# Patient Record
Sex: FEMALE | Race: WHITE | Employment: UNEMPLOYED | URBAN - METROPOLITAN AREA
[De-identification: names, ages, dates, MRNs, and addresses within clinical notes are randomized per-mention and may not be internally consistent; named-entity substitution may affect disease eponyms.]

---

## 2022-07-17 ENCOUNTER — HOSPITAL ENCOUNTER (INPATIENT)
Age: 87
LOS: 6 days | Discharge: NURSING, REHAB OR LONG-TERM CARE | DRG: 659 | End: 2022-07-23
Attending: EMERGENCY MEDICINE | Admitting: INTERNAL MEDICINE
Payer: COMMERCIAL

## 2022-07-17 ENCOUNTER — APPOINTMENT (OUTPATIENT)
Dept: GENERAL RADIOLOGY | Age: 87
DRG: 659 | End: 2022-07-17
Payer: COMMERCIAL

## 2022-07-17 DIAGNOSIS — N17.9 ACUTE RENAL FAILURE, UNSPECIFIED ACUTE RENAL FAILURE TYPE (HCC): ICD-10-CM

## 2022-07-17 DIAGNOSIS — N17.9 AKI (ACUTE KIDNEY INJURY) (HCC): Primary | ICD-10-CM

## 2022-07-17 LAB
ANION GAP SERPL CALCULATED.3IONS-SCNC: 21 MMOL/L (ref 7–16)
APTT: 43.6 SEC (ref 24.5–35.1)
BASOPHILS ABSOLUTE: 0.09 E9/L (ref 0–0.2)
BASOPHILS RELATIVE PERCENT: 1 % (ref 0–2)
BILIRUBIN URINE: NEGATIVE
BLOOD, URINE: NEGATIVE
BUN BLDV-MCNC: 80 MG/DL (ref 6–23)
CALCIUM SERPL-MCNC: 8.7 MG/DL (ref 8.6–10.2)
CHLORIDE BLD-SCNC: 101 MMOL/L (ref 98–107)
CLARITY: CLEAR
CO2: 17 MMOL/L (ref 22–29)
COLOR: YELLOW
CREAT SERPL-MCNC: 4.1 MG/DL (ref 0.5–1)
EOSINOPHILS ABSOLUTE: 0.22 E9/L (ref 0.05–0.5)
EOSINOPHILS RELATIVE PERCENT: 2.4 % (ref 0–6)
GFR AFRICAN AMERICAN: 13
GFR NON-AFRICAN AMERICAN: 10 ML/MIN/1.73
GLUCOSE BLD-MCNC: 97 MG/DL (ref 74–99)
GLUCOSE URINE: NEGATIVE MG/DL
HCT VFR BLD CALC: 40.1 % (ref 34–48)
HEMOGLOBIN: 12.8 G/DL (ref 11.5–15.5)
IMMATURE GRANULOCYTES #: 0.09 E9/L
IMMATURE GRANULOCYTES %: 1 % (ref 0–5)
INR BLD: 4.3
KETONES, URINE: NEGATIVE MG/DL
LEUKOCYTE ESTERASE, URINE: NEGATIVE
LYMPHOCYTES ABSOLUTE: 1.61 E9/L (ref 1.5–4)
LYMPHOCYTES RELATIVE PERCENT: 17.3 % (ref 20–42)
MCH RBC QN AUTO: 28.3 PG (ref 26–35)
MCHC RBC AUTO-ENTMCNC: 31.9 % (ref 32–34.5)
MCV RBC AUTO: 88.5 FL (ref 80–99.9)
MONOCYTES ABSOLUTE: 0.73 E9/L (ref 0.1–0.95)
MONOCYTES RELATIVE PERCENT: 7.8 % (ref 2–12)
NEUTROPHILS ABSOLUTE: 6.59 E9/L (ref 1.8–7.3)
NEUTROPHILS RELATIVE PERCENT: 70.5 % (ref 43–80)
NITRITE, URINE: NEGATIVE
PDW BLD-RTO: 13.3 FL (ref 11.5–15)
PH UA: 5.5 (ref 5–9)
PLATELET # BLD: 365 E9/L (ref 130–450)
PMV BLD AUTO: 9.3 FL (ref 7–12)
POTASSIUM SERPL-SCNC: 5 MMOL/L (ref 3.5–5)
PROTEIN UA: NEGATIVE MG/DL
PROTHROMBIN TIME: 46.5 SEC (ref 9.3–12.4)
RBC # BLD: 4.53 E12/L (ref 3.5–5.5)
SODIUM BLD-SCNC: 139 MMOL/L (ref 132–146)
SPECIFIC GRAVITY UA: 1.01 (ref 1–1.03)
UROBILINOGEN, URINE: 0.2 E.U./DL
WBC # BLD: 9.3 E9/L (ref 4.5–11.5)

## 2022-07-17 PROCEDURE — 1200000000 HC SEMI PRIVATE

## 2022-07-17 PROCEDURE — 81003 URINALYSIS AUTO W/O SCOPE: CPT

## 2022-07-17 PROCEDURE — 93005 ELECTROCARDIOGRAM TRACING: CPT | Performed by: STUDENT IN AN ORGANIZED HEALTH CARE EDUCATION/TRAINING PROGRAM

## 2022-07-17 PROCEDURE — 87088 URINE BACTERIA CULTURE: CPT

## 2022-07-17 PROCEDURE — 2580000003 HC RX 258: Performed by: EMERGENCY MEDICINE

## 2022-07-17 PROCEDURE — 6360000002 HC RX W HCPCS: Performed by: EMERGENCY MEDICINE

## 2022-07-17 PROCEDURE — 85025 COMPLETE CBC W/AUTO DIFF WBC: CPT

## 2022-07-17 PROCEDURE — 96372 THER/PROPH/DIAG INJ SC/IM: CPT

## 2022-07-17 PROCEDURE — 87186 SC STD MICRODIL/AGAR DIL: CPT

## 2022-07-17 PROCEDURE — 87077 CULTURE AEROBIC IDENTIFY: CPT

## 2022-07-17 PROCEDURE — 87040 BLOOD CULTURE FOR BACTERIA: CPT

## 2022-07-17 PROCEDURE — 85730 THROMBOPLASTIN TIME PARTIAL: CPT

## 2022-07-17 PROCEDURE — 99285 EMERGENCY DEPT VISIT HI MDM: CPT

## 2022-07-17 PROCEDURE — 36415 COLL VENOUS BLD VENIPUNCTURE: CPT

## 2022-07-17 PROCEDURE — 80048 BASIC METABOLIC PNL TOTAL CA: CPT

## 2022-07-17 PROCEDURE — 85610 PROTHROMBIN TIME: CPT

## 2022-07-17 PROCEDURE — 73502 X-RAY EXAM HIP UNI 2-3 VIEWS: CPT

## 2022-07-17 RX ORDER — POLYETHYLENE GLYCOL 3350 17 G/17G
17 POWDER, FOR SOLUTION ORAL DAILY PRN
Status: DISCONTINUED | OUTPATIENT
Start: 2022-07-17 | End: 2022-07-23 | Stop reason: HOSPADM

## 2022-07-17 RX ORDER — ZIPRASIDONE MESYLATE 20 MG/ML
10 INJECTION, POWDER, LYOPHILIZED, FOR SOLUTION INTRAMUSCULAR ONCE
Status: COMPLETED | OUTPATIENT
Start: 2022-07-17 | End: 2022-07-18

## 2022-07-17 RX ORDER — LISINOPRIL 10 MG/1
10 TABLET ORAL DAILY
COMMUNITY

## 2022-07-17 RX ORDER — ZIPRASIDONE MESYLATE 20 MG/ML
10 INJECTION, POWDER, LYOPHILIZED, FOR SOLUTION INTRAMUSCULAR ONCE
Status: COMPLETED | OUTPATIENT
Start: 2022-07-17 | End: 2022-07-17

## 2022-07-17 RX ORDER — ACETAMINOPHEN 325 MG/1
650 TABLET ORAL EVERY 6 HOURS PRN
Status: DISCONTINUED | OUTPATIENT
Start: 2022-07-17 | End: 2022-07-23 | Stop reason: HOSPADM

## 2022-07-17 RX ORDER — ROSUVASTATIN CALCIUM 10 MG/1
10 TABLET, COATED ORAL EVERY EVENING
COMMUNITY

## 2022-07-17 RX ORDER — ACETAMINOPHEN 650 MG/1
650 SUPPOSITORY RECTAL EVERY 6 HOURS PRN
Status: DISCONTINUED | OUTPATIENT
Start: 2022-07-17 | End: 2022-07-23 | Stop reason: HOSPADM

## 2022-07-17 RX ORDER — PROMETHAZINE HYDROCHLORIDE 12.5 MG/1
12.5 TABLET ORAL EVERY 6 HOURS PRN
Status: DISCONTINUED | OUTPATIENT
Start: 2022-07-17 | End: 2022-07-23 | Stop reason: HOSPADM

## 2022-07-17 RX ORDER — LISINOPRIL 10 MG/1
10 TABLET ORAL DAILY
Status: DISCONTINUED | OUTPATIENT
Start: 2022-07-18 | End: 2022-07-22

## 2022-07-17 RX ORDER — SODIUM CHLORIDE 9 MG/ML
INJECTION, SOLUTION INTRAVENOUS CONTINUOUS
Status: DISCONTINUED | OUTPATIENT
Start: 2022-07-17 | End: 2022-07-18

## 2022-07-17 RX ORDER — ATORVASTATIN CALCIUM 10 MG/1
10 TABLET, FILM COATED ORAL DAILY
COMMUNITY
End: 2022-07-17

## 2022-07-17 RX ORDER — HALOPERIDOL 1 MG/1
2 TABLET ORAL EVERY 8 HOURS PRN
Status: DISCONTINUED | OUTPATIENT
Start: 2022-07-17 | End: 2022-07-23 | Stop reason: HOSPADM

## 2022-07-17 RX ORDER — 0.9 % SODIUM CHLORIDE 0.9 %
1000 INTRAVENOUS SOLUTION INTRAVENOUS ONCE
Status: COMPLETED | OUTPATIENT
Start: 2022-07-17 | End: 2022-07-17

## 2022-07-17 RX ORDER — CARVEDILOL 12.5 MG/1
12.5 TABLET ORAL 2 TIMES DAILY WITH MEALS
COMMUNITY

## 2022-07-17 RX ORDER — ROSUVASTATIN CALCIUM 10 MG/1
10 TABLET, COATED ORAL EVERY EVENING
Status: DISCONTINUED | OUTPATIENT
Start: 2022-07-17 | End: 2022-07-23 | Stop reason: HOSPADM

## 2022-07-17 RX ORDER — HALOPERIDOL 5 MG/ML
2 INJECTION INTRAMUSCULAR EVERY 8 HOURS PRN
COMMUNITY

## 2022-07-17 RX ORDER — SODIUM CHLORIDE 0.9 % (FLUSH) 0.9 %
10 SYRINGE (ML) INJECTION PRN
Status: DISCONTINUED | OUTPATIENT
Start: 2022-07-17 | End: 2022-07-23 | Stop reason: HOSPADM

## 2022-07-17 RX ORDER — LORAZEPAM 1 MG/1
1 TABLET ORAL EVERY 6 HOURS PRN
Status: DISCONTINUED | OUTPATIENT
Start: 2022-07-17 | End: 2022-07-23 | Stop reason: HOSPADM

## 2022-07-17 RX ORDER — DIGOXIN 125 MCG
125 TABLET ORAL DAILY
COMMUNITY

## 2022-07-17 RX ORDER — ACETAMINOPHEN 325 MG/1
650 TABLET ORAL EVERY 6 HOURS PRN
COMMUNITY

## 2022-07-17 RX ORDER — LORAZEPAM 1 MG/1
1 TABLET ORAL EVERY 6 HOURS PRN
COMMUNITY

## 2022-07-17 RX ORDER — HALOPERIDOL 5 MG/ML
2 INJECTION INTRAMUSCULAR EVERY 8 HOURS PRN
Status: DISCONTINUED | OUTPATIENT
Start: 2022-07-17 | End: 2022-07-23 | Stop reason: HOSPADM

## 2022-07-17 RX ORDER — HALOPERIDOL 2 MG/1
2 TABLET ORAL EVERY 8 HOURS PRN
COMMUNITY

## 2022-07-17 RX ORDER — CARVEDILOL 6.25 MG/1
12.5 TABLET ORAL 2 TIMES DAILY WITH MEALS
Status: DISCONTINUED | OUTPATIENT
Start: 2022-07-18 | End: 2022-07-23 | Stop reason: HOSPADM

## 2022-07-17 RX ORDER — DIGOXIN 125 MCG
125 TABLET ORAL DAILY
Status: DISCONTINUED | OUTPATIENT
Start: 2022-07-18 | End: 2022-07-23 | Stop reason: HOSPADM

## 2022-07-17 RX ORDER — SODIUM CHLORIDE 0.9 % (FLUSH) 0.9 %
10 SYRINGE (ML) INJECTION EVERY 12 HOURS SCHEDULED
Status: DISCONTINUED | OUTPATIENT
Start: 2022-07-17 | End: 2022-07-23 | Stop reason: HOSPADM

## 2022-07-17 RX ORDER — OLANZAPINE 2.5 MG/1
2.5 TABLET ORAL NIGHTLY
Status: DISCONTINUED | OUTPATIENT
Start: 2022-07-17 | End: 2022-07-23 | Stop reason: HOSPADM

## 2022-07-17 RX ORDER — ENOXAPARIN SODIUM 100 MG/ML
40 INJECTION SUBCUTANEOUS DAILY
Status: DISCONTINUED | OUTPATIENT
Start: 2022-07-17 | End: 2022-07-18

## 2022-07-17 RX ORDER — SODIUM CHLORIDE 9 MG/ML
INJECTION, SOLUTION INTRAVENOUS PRN
Status: DISCONTINUED | OUTPATIENT
Start: 2022-07-17 | End: 2022-07-23 | Stop reason: HOSPADM

## 2022-07-17 RX ORDER — OLANZAPINE 2.5 MG/1
2.5 TABLET ORAL NIGHTLY
COMMUNITY

## 2022-07-17 RX ORDER — ONDANSETRON 2 MG/ML
4 INJECTION INTRAMUSCULAR; INTRAVENOUS EVERY 6 HOURS PRN
Status: DISCONTINUED | OUTPATIENT
Start: 2022-07-17 | End: 2022-07-23 | Stop reason: HOSPADM

## 2022-07-17 RX ADMIN — ZIPRASIDONE MESYLATE 10 MG: 20 INJECTION, POWDER, LYOPHILIZED, FOR SOLUTION INTRAMUSCULAR at 02:40

## 2022-07-17 RX ADMIN — SODIUM CHLORIDE 1000 ML: 9 INJECTION, SOLUTION INTRAVENOUS at 04:50

## 2022-07-17 RX ADMIN — SODIUM CHLORIDE: 9 INJECTION, SOLUTION INTRAVENOUS at 06:29

## 2022-07-17 NOTE — H&P
Hospitalist History & Physical      PCP: No primary care provider on file. Date of Service: Pt seen/examined on 7/17/2022     Chief Complaint:  had concerns including Other (Facility stated Pt was given haldol, and ativan 24 hr ago. Pt was quiet all day and hypotensive in favitly, upon arrival Pt vitals WNL. [facility] provider wanted Pt to be seen. ). History Of Present Illness:    Ms. Sim Rod, a 82y.o. year old female  who  has no past medical history on file. Patient presented to the emergency department from a local nursing facility. Patient has been hypotensive at the facility. Patient apparently has been screaming out and was given Haldol and Ativan. Vital signs in the emergency department show the patient be somewhat hypotensive with a pressure of 88/55. Remainder of her vital signs are within normal limits and stable. Laboratory studies notable for BUN 80, creatinine 4.1, anion gap 21, CO2 17. She was started on normal saline at 100 mL/h. Also given Geodon. Medicine consulted for admission. No past medical history on file. No past surgical history on file. Prior to Admission medications    Not on File         Allergies:  Patient has no known allergies. Social History:    TOBACCO:   has no history on file for tobacco use. ETOH:   has no history on file for alcohol use. Family History:    Reviewed in detail and negative for DM, CAD, Cancer, CVA. Positive as follows\"  No family history on file. REVIEW OF SYSTEMS:   Pertinent positives as noted in the HPI. All other systems reviewed and negative. PHYSICAL EXAM:  BP (!) 88/55   Pulse 92   Temp 97.1 °F (36.2 °C)   Resp 19   SpO2 95%   General appearance: Confused  HEENT: Normal cephalic, atraumatic without obvious deformity. Pupils equal, round, and reactive to light. Extra ocular muscles intact. Conjunctivae/corneas clear. Neck: Supple, with full range of motion. No jugular venous distention.  Trachea midline. Respiratory: Clear to auscultation bilaterally  Cardiovascular: Regular rate and rhythm  Abdomen: Soft, nontender, nondistended  Musculoskeletal: No clubbing, cyanosis, edema of bilateral lower extremities. Brisk capillary refill. Skin: Normal skin color. No rashes or lesions. Neurologic: Altered, no focal deficits      CBC:   Recent Labs     07/17/22  0358   WBC 9.3   RBC 4.53   HGB 12.8   HCT 40.1   MCV 88.5   RDW 13.3        BMP:   Recent Labs     07/17/22  0358      K 5.0      CO2 17*   BUN 80*   CREATININE 4.1*     LFT:  No results for input(s): PROT, ALB, ALKPHOS, ALT, AST, BILITOT, AMYLASE, LIPASE in the last 72 hours. CE:  No results for input(s): Drifton Pears in the last 72 hours. PT/INR: No results for input(s): INR, APTT in the last 72 hours. BNP: No results for input(s): BNP in the last 72 hours. ESR: No results found for: SEDRATE  CRP: No results found for: CRP  D Dimer: No results found for: DDIMER   Folate and B12: No results found for: OWDGWAZO16, No results found for: FOLATE  Lactic Acid: No results found for: LACTA  Thyroid Studies: No results found for: TSH, J5NBWEA, Y8AANIO, THYROIDAB    Oupatient labs:  No results found for: CHOL, TRIG, HDL, LDLCALC, TSH, PSA, INR, LABA1C    Urinalysis:  No results found for: NITRU, WBCUA, BACTERIA, RBCUA, BLOODU, SPECGRAV, GLUCOSEU    Imaging:  No results found. ASSESSMENT:  -Acute renal failure  -Altered mental status  -Dementia  -Hypotension      PLAN:  -Admit to medicine  -Consult nephrology  -Monitor renal function avoid nephrotoxic medications  -Normal saline 100 mL/h        Diet: No diet orders on file  Code Status: No Order  Surrogate decision maker confirmed with patient:   No emergency contact information on file.     DVT Prophylaxis: []Lovenox []Heparin []PCD [] [address]  []Encouraged ambulation  Disposition: []Med/Surg [] Intermediate [] ICU/CCU  Admit status: [] Observation [] Inpatient +++++++++++++++++++++++++++++++++++++++++++++++++  Springfieldsonny YorkDO  +++++++++++++++++++++++++++++++++++++++++++++++++  NOTE: This report was transcribed using voice recognition software. Every effort was made to ensure accuracy; however, inadvertent computerized transcription errors may be present.

## 2022-07-17 NOTE — LETTER
[place] Department Medicaid  CERTIFICATION OF NECESSITY  FOR NON-EMERGENCY TRANSPORTATION   BY GROUND AMBULANCE      Individual Information   1. Name: Tammy Valderrama 2. [place] Medicaid Billing Number:    3. Address: [address] [place] [address]      Transportation Provider Information   4. Provider Name:    5. [place] Medicaid Provider Number:  National Provider Identifier (NPI):      Certification  7. Criteria:  During transport, this individual requires:  [x] Medical treatment or continuous     supervision by an EMT. [] The administration or regulation of oxygen by another person. [] Supervised protective restraint. 8. Period Beginning Date: 22   9. Length  [x] Not more than 7 day(s)  [] One Year     Additional Information Relevant to Certification   10. Comments or Explanations, If Necessary or Appropriate   DEMENTIA, UNSAFE TO TRAVEL UNATTENDED,HIGH FALL RISK, PSYCH ISSUES     Certifying Practitioner Information   11. Name of Practitioner: DR. Hans Christie MD   12. [place] Medicaid Provider Number, If Applicable:  [address] Provider Identifier (NPI):      Signature Information   14. Date of Signature: 22 15. Name of Person Signing: Marta Toussaint   16. Signature and Professional Designation: Electronically signed by GREG Gan on 2022 at 11:37 AM       [facility] [id number]  Rev. 2015       [facility] Encounter Date/Time: 2022 Seamus Henderson Account: [de-identified]    MRN: [id number]    Patient: Tammy Valderrama    Contact Serial #: [id number]      ENCOUNTER          Patient Class: I Private Enc? No Unit RM BD: [facility] Service:  INM   Encounter DX: Acute renal failure (ARF*   ADM Provider: Scott Perez MD   Procedure:     ATT Provider: Liat Mcclelland MD   REF Provider:        Admission DX: Acute renal failure (ARF) ([facility] SHLOMO (acute kidney injury) ([facility] and DX codes: N17.9, N17.9      PATIENT                 Name: Tammy Valderrama : 1935 (82 yrs) Address: [address] [place] [address] Sex: Female   City: [address] [place] [id number]         Marital Status:    Employer: NOT EMPLOYED         Buddhism: Mennonite   Primary Care Provider:           Primary Phone: [phone]   EMERGENCY CONTACT   Contact Name Legal Guardian? Relationship to Patient Home Phone Work Phone   1. Savannah Landon  2. *No Contact Specified*      Child                      GUARANTOR            Guarantor: Olimpiaisidra Vinson     : 1935   Address: [address] [place] [address] Sex: Female     [place] [address]     Relation to Patient: Self       Home Phone: [phone]   Guarantor ID: [id number]       Work Phone:     Guarantor Employer: NOT EMPLOYED         Status: NOT EMPLO*      COVERAGE        PRIMARY INSURANCE   Payor: MEDICARE Plan: MEDICARE PART A AND B   Payor Address: [facility] [address],  [place] [address]       Group Number:   Insurance Type: INDEMNITY   Subscriber Name: [facility] : 1935   Subscriber ID: Tam Serrato. Rel. to Sub: Self   SECONDARY INSURANCE   Payor:   Plan:     Payor Address: ,          Group Number:   Insurance Type:     Subscriber Name:   Subscriber :     Subscriber ID:   [facility].  Rel. to Sub:             CSN: [id number]

## 2022-07-17 NOTE — ED NOTES
Multiple attempts to get second set of blood cultures were unsuccessful     Raisa Perea  07/17/22 2313

## 2022-07-17 NOTE — ED NOTES
Presently resting quietly, no yelling out noted. Easily arouses to voice and tactile stimulation.       Gloria Mathis RN  07/17/22 0819

## 2022-07-17 NOTE — ED PROVIDER NOTES
Department of Emergency Medicine   ED  Provider Note  Admit Date/RoomTime: 7/17/2022  2:07 AM  ED Room: 13/13          History of Present Illness:  7/17/22, Time: 4:51 AM EDT  Chief Complaint   Patient presents with    Other     Facility stated Pt was given haldol, and ativan 24 hr ago. Pt was quiet all day and hypotensive in favitly, upon arrival Pt vitals WNL. [facility] provider wanted Pt to be seen. Chip Reid is a 82 y.o. female presenting to the ED for fatigue. Patient's presents from [facility].  She does a history of baseline dementia. Apparently, her baseline is constantly screaming out. She received Haldol and Ativan about 24 hours prior to arrival.  She has been sleeping throughout the day, they were concerned that they may have oversedated her. She was sent in for evaluation. On arrival, the patient is screaming out. She cannot provide any other history due to her baseline mental status. Review of Systems:   Unable to obtain due to the patient's current mental status      --------------------------------------------- PAST HISTORY ---------------------------------------------  Past Medical History:  has no past medical history on file. Past Surgical History:  has no past surgical history on file. Social History:      Family History: family history is not on file. . Unless otherwise noted, family history is non contributory    The patients home medications have been reviewed. Allergies: Patient has no known allergies. ---------------------------------------------------PHYSICAL EXAM--------------------------------------    Constitutional/General: Alert and oriented x 1  Head: Normocephalic and atraumatic  Eyes: PERRL, EOMI, sclera non icteric  Mouth: Oropharynx clear, dry mucosal membranes  Neck: Supple, full ROM, no stridor, no meningeal signs  Respiratory: Lungs clear to auscultation bilaterally, no wheezes, rales, or rhonchi.  Not in respiratory distress  Cardiovascular:  Regular rate. Regular rhythm. 2+ distal pulses. Equal extremity pulses. Chest: No chest wall tenderness  GI:  Abdomen Soft, Non tender, Non distended. No rebound, guarding, or rigidity. No pulsatile masses. Musculoskeletal: Moves all extremities x 4. Warm and well perfused, no clubbing, cyanosis, or edema. Capillary refill <3 seconds  Integument: skin warm and dry. No rashes. Neurologic: GCS 14  Psychiatric: Normal Affect          -------------------------------------------------- RESULTS -------------------------------------------------  I have personally reviewed all laboratory and imaging results for this patient. Results are listed below.      LABS: (Lab results interpreted by me)  Results for orders placed or performed during the hospital encounter of 07/17/22   CBC with Auto Differential   Result Value Ref Range    WBC 9.3 4.5 - 11.5 E9/L    RBC 4.53 3.50 - 5.50 E12/L    Hemoglobin 12.8 11.5 - 15.5 g/dL    Hematocrit 40.1 34.0 - 48.0 %    MCV 88.5 80.0 - 99.9 fL    MCH 28.3 26.0 - 35.0 pg    MCHC 31.9 (L) 32.0 - 34.5 %    RDW 13.3 11.5 - 15.0 fL    Platelets 588 717 - 146 E9/L    MPV 9.3 7.0 - 12.0 fL    Neutrophils % 70.5 43.0 - 80.0 %    Immature Granulocytes % 1.0 0.0 - 5.0 %    Lymphocytes % 17.3 (L) 20.0 - 42.0 %    Monocytes % 7.8 2.0 - 12.0 %    Eosinophils % 2.4 0.0 - 6.0 %    Basophils % 1.0 0.0 - 2.0 %    Neutrophils Absolute 6.59 1.80 - 7.30 E9/L    Immature Granulocytes # 0.09 E9/L    Lymphocytes Absolute 1.61 1.50 - 4.00 E9/L    Monocytes Absolute 0.73 0.10 - 0.95 E9/L    Eosinophils Absolute 0.22 0.05 - 0.50 E9/L    Basophils Absolute 0.09 0.00 - 0.20 J8/U   Basic Metabolic Panel   Result Value Ref Range    Sodium 139 132 - 146 mmol/L    Potassium 5.0 3.5 - 5.0 mmol/L    Chloride 101 98 - 107 mmol/L    CO2 17 (L) 22 - 29 mmol/L    Anion Gap 21 (H) 7 - 16 mmol/L    Glucose 97 74 - 99 mg/dL    BUN 80 (H) 6 - 23 mg/dL    CREATININE 4.1 (H) 0.5 - 1.0 mg/dL GFR Non-African American 10 >=60 mL/min/1.73    GFR African American 13     Calcium 8.7 8.6 - 10.2 mg/dL   ,       RADIOLOGY:  Interpreted by Radiologist unless otherwise specified  No orders to display         EKG Interpretation  Interpreted by emergency department physician, Dr. Jailene Giron     SHARLENE fib, rate 86, no STEMI, no ischemic change       ------------------------- NURSING NOTES AND VITALS REVIEWED ---------------------------   The nursing notes within the ED encounter and vital signs as below have been reviewed by myself  BP (!) 88/55   Pulse 92   Temp 97.1 °F (36.2 °C)   Resp 19   SpO2 95%     Oxygen Saturation Interpretation: Normal    The patients available past medical records and past encounters were reviewed. ------------------------------ ED COURSE/MEDICAL DECISION MAKING----------------------  Medications   0.9 % sodium chloride bolus (1,000 mLs IntraVENous New Bag 7/17/22 0450)   0.9 % sodium chloride infusion (has no administration in time range)   ziprasidone (GEODON) injection 10 mg (10 mg IntraMUSCular Given 7/17/22 0240)              Medical Decision Making: On arrival, the patient was screaming out, she was somewhat combative. Required Geodon. Labs reviewed. Obtain prior labs from the outside facility, baseline creatinine appears to be 1. Patient is in acute renal failure. Did receive IV fluids. Urine is pending at this time. Discussed with the hospitalist, patient will be admitted. Counseling: The emergency provider has spoken with the patient and discussed todays results, in addition to providing specific details for the plan of care and counseling regarding the diagnosis and prognosis. Questions are answered at this time and they are agreeable with the plan.       --------------------------------- IMPRESSION AND DISPOSITION ---------------------------------    IMPRESSION  1.  SHLOMO (acute kidney injury) ([facility]        DISPOSITION  Disposition: Admit to telemetry  Patient condition is stable        NOTE: This report was transcribed using voice recognition software.  Every effort was made to ensure accuracy; however, inadvertent computerized transcription errors may be present       Anai Fishman MD  07/17/22 0510       Anai Fishman MD  09/25/22 0927

## 2022-07-17 NOTE — ED NOTES
Pt incont of urine. Roslyn care complete and purwick placed to suction. Pt tolerating well at this time. Pt somewhat awake and occasionally yelling out.       Shawnee Helm RN  07/17/22 2072

## 2022-07-17 NOTE — ED NOTES
Fully awake and yelling out continually,  minimal improvement with redirection. No distress noted.       Miranda Leyva RN  07/17/22 5665

## 2022-07-17 NOTE — PROGRESS NOTES
Hospitalist Progress Note      SYNOPSIS: Patient admitted on 2022 for Acute renal failure (ARF) ([facility] [address]      SUBJECTIVE:    Patient seen and examined today. Unable to obtain any review of system as she just recently received Geodon  Records reviewed. 44-year-old female with past medical history atrial fibrillation on digoxin and Coreg but not on anticoagulation, hypertension, hyperlipidemia bipolar/schizophrenia presents due to hypotension and altered mental status from a nursing facility. Also concern for metabolic acidosis and acute kidney injury. Nephrology has been consulted    Stable overnight. No other overnight issues reported. Temp (24hrs), Av.1 °F (36.2 °C), Min:97.1 °F (36.2 °C), Max:97.1 °F (36.2 °C)    DIET: ADULT DIET; Regular  CODE: Full Code  No intake or output data in the 24 hours ending 22 08    OBJECTIVE:    BP (!) 112/58   Pulse 76   Temp 97.1 °F (36.2 °C)   Resp 21   SpO2 97%     General appearance: No apparent distress, appears stated age and cooperative. HEENT:  Conjunctivae/corneas clear. Neck: Supple. No jugular venous distention. Respiratory: Clear to auscultation bilaterally, normal respiratory effort  Cardiovascular: Regular rate rhythm, normal S1-S2  Abdomen: Soft, nontender, nondistended  Musculoskeletal: No clubbing, cyanosis, no bilateral lower extremity edema. Brisk capillary refill. Skin:  No rashes  on visible skin  Neurologic: awake, alert and following commands     ASSESSMENT:  -Acute renal failure  -Altered mental status  -Metabolic acidosis  -Hypotension  -Atrial fibrillation not on ablation      Plan  1. continue IV fluids and mostly for now. She will likely benefit from sodium bicarb given concern for metabolic acidosis. Nephrology has been consulted. Avoid nephrotoxins and medications for now. Blood cultures. DISPOSITION: Back to generations once mental status and SHLOMO improved.     Medications:  REVIEWED DAILY    Infusion Medications    sodium chloride 100 mL/hr at 07/17/22 7096    sodium chloride       Scheduled Medications    sodium chloride flush  10 mL IntraVENous 2 times per day    enoxaparin  40 mg SubCUTAneous Daily    ziprasidone  10 mg IntraMUSCular Once     PRN Meds: sodium chloride flush, sodium chloride, promethazine **OR** ondansetron, polyethylene glycol, acetaminophen **OR** acetaminophen    Labs:     Recent Labs     07/17/22  0358   WBC 9.3   HGB 12.8   HCT 40.1          Recent Labs     07/17/22  0358      K 5.0      CO2 17*   BUN 80*   CREATININE 4.1*   CALCIUM 8.7       No results for input(s): PROT, ALB, ALKPHOS, ALT, AST, BILITOT, AMYLASE, LIPASE in the last 72 hours. Recent Labs     07/17/22  0517   INR 4.3       No results for input(s): Curlie Lat in the last 72 hours. Chronic labs:    Lab Results   Component Value Date    INR 4.3 07/17/2022       Radiology: REVIEWED DAILY    +++++++++++++++++++++++++++++++++++++++++++++++++  Rebbecca Cogan, MD  [facility] Physician - [address] [place] [address]  +++++++++++++++++++++++++++++++++++++++++++++++++  NOTE: This report was transcribed using voice recognition software. Every effort was made to ensure accuracy; however, inadvertent computerized transcription errors may be present.

## 2022-07-17 NOTE — ED NOTES
Discussed insertion of velazquez with Dr. Lashonda Marks, d/t concern for pt possibly pulling it out. Dr. Lashonda Marks made aware that [place]i [place] is currently in use. Dr. Lashonda Marks currently ok with use of purwick at this time.       Margaret Vitale RN  07/17/22 3958

## 2022-07-18 ENCOUNTER — APPOINTMENT (OUTPATIENT)
Dept: ULTRASOUND IMAGING | Age: 87
DRG: 659 | End: 2022-07-18
Payer: COMMERCIAL

## 2022-07-18 LAB
ALBUMIN SERPL-MCNC: 2.8 G/DL (ref 3.5–5.2)
ALP BLD-CCNC: 113 U/L (ref 35–104)
ALT SERPL-CCNC: 13 U/L (ref 0–32)
ANION GAP SERPL CALCULATED.3IONS-SCNC: 14 MMOL/L (ref 7–16)
ANION GAP SERPL CALCULATED.3IONS-SCNC: 14 MMOL/L (ref 7–16)
AST SERPL-CCNC: 21 U/L (ref 0–31)
BASOPHILS ABSOLUTE: 0.07 E9/L (ref 0–0.2)
BASOPHILS RELATIVE PERCENT: 0.9 % (ref 0–2)
BILIRUB SERPL-MCNC: 0.5 MG/DL (ref 0–1.2)
BUN BLDV-MCNC: 65 MG/DL (ref 6–23)
BUN BLDV-MCNC: 69 MG/DL (ref 6–23)
CALCIUM SERPL-MCNC: 8.6 MG/DL (ref 8.6–10.2)
CALCIUM SERPL-MCNC: 8.7 MG/DL (ref 8.6–10.2)
CHLORIDE BLD-SCNC: 105 MMOL/L (ref 98–107)
CHLORIDE BLD-SCNC: 111 MMOL/L (ref 98–107)
CHLORIDE URINE RANDOM: 56 MMOL/L
CO2: 12 MMOL/L (ref 22–29)
CO2: 15 MMOL/L (ref 22–29)
CREAT SERPL-MCNC: 1.9 MG/DL (ref 0.5–1)
CREAT SERPL-MCNC: 2.3 MG/DL (ref 0.5–1)
CREATININE URINE: 105 MG/DL (ref 29–226)
CREATININE URINE: 105 MG/DL (ref 29–226)
CREATININE URINE: 109 MG/DL (ref 29–226)
EKG ATRIAL RATE: 468 BPM
EKG Q-T INTERVAL: 364 MS
EKG QRS DURATION: 78 MS
EKG QTC CALCULATION (BAZETT): 409 MS
EKG R AXIS: 31 DEGREES
EKG T AXIS: 111 DEGREES
EKG VENTRICULAR RATE: 76 BPM
EOSINOPHILS ABSOLUTE: 0.26 E9/L (ref 0.05–0.5)
EOSINOPHILS RELATIVE PERCENT: 3.3 % (ref 0–6)
GFR AFRICAN AMERICAN: 24
GFR AFRICAN AMERICAN: 30
GFR NON-AFRICAN AMERICAN: 20 ML/MIN/1.73
GFR NON-AFRICAN AMERICAN: 25 ML/MIN/1.73
GLUCOSE BLD-MCNC: 109 MG/DL (ref 74–99)
GLUCOSE BLD-MCNC: 80 MG/DL (ref 74–99)
HCT VFR BLD CALC: 36.8 % (ref 34–48)
HEMOGLOBIN: 11.7 G/DL (ref 11.5–15.5)
IMMATURE GRANULOCYTES #: 0.05 E9/L
IMMATURE GRANULOCYTES %: 0.6 % (ref 0–5)
LACTIC ACID: 0.8 MMOL/L (ref 0.5–2.2)
LYMPHOCYTES ABSOLUTE: 1.34 E9/L (ref 1.5–4)
LYMPHOCYTES RELATIVE PERCENT: 17 % (ref 20–42)
MCH RBC QN AUTO: 28.7 PG (ref 26–35)
MCHC RBC AUTO-ENTMCNC: 31.8 % (ref 32–34.5)
MCV RBC AUTO: 90.4 FL (ref 80–99.9)
MICROALBUMIN UR-MCNC: 12.7 MG/L
MICROALBUMIN/CREAT UR-RTO: 11.7 (ref 0–30)
MONOCYTES ABSOLUTE: 0.58 E9/L (ref 0.1–0.95)
MONOCYTES RELATIVE PERCENT: 7.4 % (ref 2–12)
NEUTROPHILS ABSOLUTE: 5.57 E9/L (ref 1.8–7.3)
NEUTROPHILS RELATIVE PERCENT: 70.8 % (ref 43–80)
PDW BLD-RTO: 13.3 FL (ref 11.5–15)
PH VENOUS: 7.37 (ref 7.35–7.45)
PLATELET # BLD: 346 E9/L (ref 130–450)
PMV BLD AUTO: 9.2 FL (ref 7–12)
POTASSIUM REFLEX MAGNESIUM: 5 MMOL/L (ref 3.5–5)
POTASSIUM SERPL-SCNC: 5.4 MMOL/L (ref 3.5–5)
POTASSIUM, UR: 20.9 MMOL/L
PROTEIN PROTEIN: 15 MG/DL (ref 0–12)
PROTEIN/CREAT RATIO: 0.1
PROTEIN/CREAT RATIO: 0.1 (ref 0–0.2)
RBC # BLD: 4.07 E12/L (ref 3.5–5.5)
SODIUM BLD-SCNC: 131 MMOL/L (ref 132–146)
SODIUM BLD-SCNC: 140 MMOL/L (ref 132–146)
SODIUM URINE: 86 MMOL/L
TOTAL PROTEIN: 6.9 G/DL (ref 6.4–8.3)
WBC # BLD: 7.9 E9/L (ref 4.5–11.5)

## 2022-07-18 PROCEDURE — 82436 ASSAY OF URINE CHLORIDE: CPT

## 2022-07-18 PROCEDURE — 87150 DNA/RNA AMPLIFIED PROBE: CPT

## 2022-07-18 PROCEDURE — 6370000000 HC RX 637 (ALT 250 FOR IP): Performed by: FAMILY MEDICINE

## 2022-07-18 PROCEDURE — 80048 BASIC METABOLIC PNL TOTAL CA: CPT

## 2022-07-18 PROCEDURE — 1200000000 HC SEMI PRIVATE

## 2022-07-18 PROCEDURE — 6360000002 HC RX W HCPCS: Performed by: FAMILY MEDICINE

## 2022-07-18 PROCEDURE — 80053 COMPREHEN METABOLIC PANEL: CPT

## 2022-07-18 PROCEDURE — 2580000003 HC RX 258: Performed by: FAMILY MEDICINE

## 2022-07-18 PROCEDURE — 84133 ASSAY OF URINE POTASSIUM: CPT

## 2022-07-18 PROCEDURE — 6360000002 HC RX W HCPCS: Performed by: EMERGENCY MEDICINE

## 2022-07-18 PROCEDURE — 82800 BLOOD PH: CPT

## 2022-07-18 PROCEDURE — 84300 ASSAY OF URINE SODIUM: CPT

## 2022-07-18 PROCEDURE — 2500000003 HC RX 250 WO HCPCS: Performed by: NURSE PRACTITIONER

## 2022-07-18 PROCEDURE — 2580000003 HC RX 258: Performed by: NURSE PRACTITIONER

## 2022-07-18 PROCEDURE — 82044 UR ALBUMIN SEMIQUANTITATIVE: CPT

## 2022-07-18 PROCEDURE — 85025 COMPLETE CBC W/AUTO DIFF WBC: CPT

## 2022-07-18 PROCEDURE — 76770 US EXAM ABDO BACK WALL COMP: CPT

## 2022-07-18 PROCEDURE — 87040 BLOOD CULTURE FOR BACTERIA: CPT

## 2022-07-18 PROCEDURE — 83605 ASSAY OF LACTIC ACID: CPT

## 2022-07-18 PROCEDURE — 82570 ASSAY OF URINE CREATININE: CPT

## 2022-07-18 PROCEDURE — 36415 COLL VENOUS BLD VENIPUNCTURE: CPT

## 2022-07-18 PROCEDURE — 84156 ASSAY OF PROTEIN URINE: CPT

## 2022-07-18 RX ADMIN — CARVEDILOL 12.5 MG: 6.25 TABLET, FILM COATED ORAL at 18:03

## 2022-07-18 RX ADMIN — OLANZAPINE 2.5 MG: 2.5 TABLET, FILM COATED ORAL at 00:06

## 2022-07-18 RX ADMIN — SODIUM BICARBONATE: 84 INJECTION, SOLUTION INTRAVENOUS at 09:45

## 2022-07-18 RX ADMIN — CARVEDILOL 12.5 MG: 6.25 TABLET, FILM COATED ORAL at 08:39

## 2022-07-18 RX ADMIN — LORAZEPAM 1 MG: 1 TABLET ORAL at 18:03

## 2022-07-18 RX ADMIN — HALOPERIDOL 2 MG: 1 TABLET ORAL at 09:40

## 2022-07-18 RX ADMIN — OLANZAPINE 2.5 MG: 2.5 TABLET, FILM COATED ORAL at 20:43

## 2022-07-18 RX ADMIN — ROSUVASTATIN CALCIUM 10 MG: 10 TABLET, FILM COATED ORAL at 20:43

## 2022-07-18 RX ADMIN — LISINOPRIL 10 MG: 10 TABLET ORAL at 08:39

## 2022-07-18 RX ADMIN — HALOPERIDOL LACTATE 2 MG: 5 INJECTION, SOLUTION INTRAMUSCULAR at 20:43

## 2022-07-18 RX ADMIN — Medication 10 ML: at 20:44

## 2022-07-18 RX ADMIN — ZIPRASIDONE MESYLATE 10 MG: 20 INJECTION, POWDER, LYOPHILIZED, FOR SOLUTION INTRAMUSCULAR at 22:56

## 2022-07-18 RX ADMIN — DIGOXIN 125 MCG: 125 TABLET ORAL at 08:39

## 2022-07-18 RX ADMIN — ROSUVASTATIN CALCIUM 10 MG: 10 TABLET, FILM COATED ORAL at 00:06

## 2022-07-18 RX ADMIN — SODIUM BICARBONATE: 84 INJECTION, SOLUTION INTRAVENOUS at 23:03

## 2022-07-18 RX ADMIN — ACETAMINOPHEN 650 MG: 325 TABLET ORAL at 18:03

## 2022-07-18 ASSESSMENT — PAIN SCALES - GENERAL: PAINLEVEL_OUTOF10: 0

## 2022-07-18 NOTE — ED NOTES
Assumed patient care. Report from [address] [place] [address]. Patient in transport to go to floor.       Marcelo Ludwig RN  07/18/22 4104

## 2022-07-18 NOTE — CONSULTS
Department of Internal Medicine  Nephrology Nurse Practitioner Consult Note      Reason for Consult: SHLOMO    Requesting Physician: Dr Rodney Hills:  fatigue and hypotension    History Obtained From:  patient, electronic medical record    HISTORY OF PRESENT ILLNESS: Briefly, Ms. Mildred Carson, is an 46-year-old female with a past medical history of HTN, AF, HLD, dementia, and bipolar/schizophrenia, who was admitted after presenting to the ER on July 17, 2022 from hospitals with fatigue and hypotension from possible oversedation. Ms. Shemar Mendoza was given haldol and ativan approximately 24 hours before arrival to the ED. Labs on admission were significant for sodium 139 potassium 5.0, chloride 101, bicarb 17, BUN 80, creatinine 4.1, and anion gap 21. We are consulted for SHLOMO and metabolic acidosis. Current home medications include carvedilol, digoxin, lisinopril, olanzapine, rosuvastatin, ativan and haldol. Past Medical History:    AF, HTN, HLD, dementia and bipolar/schizophrenia. Past Surgical History:    No past surgical history on file.   Current Medications:    Current Facility-Administered Medications: sodium bicarbonate 150 mEq in dextrose 5 % 1,000 mL infusion, , IntraVENous, Continuous  sodium chloride flush 0.9 % injection 10 mL, 10 mL, IntraVENous, 2 times per day  sodium chloride flush 0.9 % injection 10 mL, 10 mL, IntraVENous, PRN  0.9 % sodium chloride infusion, , IntraVENous, PRN  enoxaparin (LOVENOX) injection 40 mg, 40 mg, SubCUTAneous, Daily  promethazine (PHENERGAN) tablet 12.5 mg, 12.5 mg, Oral, Q6H PRN **OR** ondansetron (ZOFRAN) injection 4 mg, 4 mg, IntraVENous, Q6H PRN  polyethylene glycol (GLYCOLAX) packet 17 g, 17 g, Oral, Daily PRN  acetaminophen (TYLENOL) tablet 650 mg, 650 mg, Oral, Q6H PRN **OR** acetaminophen (TYLENOL) suppository 650 mg, 650 mg, Rectal, Q6H PRN  ziprasidone (GEODON) injection 10 mg, 10 mg, IntraMUSCular, Once  carvedilol (COREG) tablet 12.5 mg, 12.5 mg, Oral, BID WC  digoxin (LANOXIN) tablet 125 mcg, 125 mcg, Oral, Daily  haloperidol (HALDOL) tablet 2 mg, 2 mg, Oral, Q8H PRN  haloperidol lactate (HALDOL) injection 2 mg, 2 mg, IntraMUSCular, Q8H PRN  [Held by provider] lisinopril (PRINIVIL;ZESTRIL) tablet 10 mg, 10 mg, Oral, Daily  LORazepam (ATIVAN) tablet 1 mg, 1 mg, Oral, Q6H PRN  OLANZapine (ZYPREXA) tablet 2.5 mg, 2.5 mg, Oral, Nightly  rosuvastatin (CRESTOR) tablet 10 mg, 10 mg, Oral, QPM  Allergies:  Patient has no known allergies. Social History:    TOBACCO:  unknown   ETOH:  unknown    Family History:   No family history on file.   REVIEW OF SYSTEMS:    Review of systems not obtained due to patient factors - mental status  PHYSICAL EXAM:      Vitals:    VITALS:  /74   Pulse 78   Temp 97.1 °F (36.2 °C)   Resp 17   SpO2 98%   CURRENT TEMPERATURE:  Temp: 97.1 °F (36.2 °C)  24HR INTAKE/OUTPUT:    Intake/Output Summary (Last 24 hours) at 7/18/2022 1353  Last data filed at 7/18/2022 0953  Gross per 24 hour   Intake 15.07 ml   Output --   Net 15.07 ml       Constitutional: Awake, alert, NAD  HEENT: PERRL, normocephalic, atraumatic  Respiratory: CTA B, normal respirations  Cardiovascular/Edema: RRR, S1/S2, no edema noted on exam  Gastrointestinal: Abdomen soft, nontender  Neurologic: Confused  Skin: Warm, dry, no rash or lesion  Other:      DATA:      CBC:   Lab Results   Component Value Date/Time    WBC 7.9 07/18/2022 06:44 AM    RBC 4.07 07/18/2022 06:44 AM    HGB 11.7 07/18/2022 06:44 AM    HCT 36.8 07/18/2022 06:44 AM    MCV 90.4 07/18/2022 06:44 AM    MCH 28.7 07/18/2022 06:44 AM    MCHC 31.8 07/18/2022 06:44 AM    RDW 13.3 07/18/2022 06:44 AM     07/18/2022 06:44 AM    MPV 9.2 07/18/2022 06:44 AM     CMP:    Lab Results   Component Value Date/Time     07/18/2022 06:44 AM    K 5.0 07/18/2022 06:44 AM     07/18/2022 06:44 AM    CO2 15 07/18/2022 06:44 AM    BUN 69 07/18/2022 06:44 AM    CREATININE 2.3 07/18/2022 06:44 AM    GFRAA 24 07/18/2022 06:44 AM    LABGLOM 20 07/18/2022 06:44 AM    GLUCOSE 80 07/18/2022 06:44 AM    PROT 6.9 07/18/2022 06:44 AM    LABALBU 2.8 07/18/2022 06:44 AM    CALCIUM 8.6 07/18/2022 06:44 AM    BILITOT 0.5 07/18/2022 06:44 AM    ALKPHOS 113 07/18/2022 06:44 AM    AST 21 07/18/2022 06:44 AM    ALT 13 07/18/2022 06:44 AM     Magnesium:  No results found for: MG  Phosphorus:  No results found for: PHOS    BRIEF SUMMARY OF INITIAL CONSULT:      Briefly, Ms. Chip Reid, is an 82-year-old female with a past medical history of HTN, AF, HLD, dementia, and bipolar/schizophrenia, who was admitted after presenting to the ER on July 17, 2022 from [facility] with fatigue and hypotension from possible oversedation. Ms. Jone Fierro was given haldol and ativan approximately 24 hours before arrival to the ED. Labs on admission were significant for sodium 139 potassium 5.0, chloride 101, bicarb 17, BUN 80, creatinine 4.1, and anion gap 21. We are consulted for SHLOMO and metabolic acidosis. Current home medications include carvedilol, digoxin, lisinopril, olanzapine, rosuvastatin, ativan and haldol. IMPRESSION/RECOMMENDATIONS:      SHLOMO stage III, likely volume responsive pre-renal SHLOMO from poor oral intake in the setting of ACEi administration vs hemodynamically mediated due to hypotension related to medication. On admission her creatinine was 4.1 mg/dL, that did improve to 2.3 mg/dL after IVF administration. HAGMA, multifactorial, 2/2 SHLOMO and uremia with a component of starvation ketosis. Start D5 with 150 meq of sodium bicarbonate at 125cc/hr  Hypotension, improved continue to monitor  HTN, on carvedilol and lisinopril   ------------------------------------------------------------------  Altered mental status, likely d/t over medication at skilled facility, improved to baseline  Atrial fibrillation, rate controlled, not on chronic anticoagulation.  On digoxin  HLD, on statin  Dementia  Bipolar/schitzophrenia, on olanzapine and ziprasidone  Nutrition, regular diet      Plan:    Start Sodium bicarbonate drip 150 meq in dextrose 5% at 125 cc/hr  Hold lisinopril  Monitor renal function daily  Obtain venous pH  Obtain Renal US  Monitor BP, avoid hypotension  Strict I&O  Obtain urine indices   Obtain UACR/UPCR      Thank you Dr. Abraham Kirk for allowing us to participate in the care of Ms.  Markel Mas

## 2022-07-18 NOTE — CARE COORDINATION
ANGEL transition of care. Pt presented to [facility] ER secondary to fatigue and hypotension after being medicated. Pt is from [facility].   Admitted with ARF. Nephrology on consult. Creatinine 4.1 on admission, 2.3 today. Spoke with Gretchen Xavier from [facility] who reports pt is not pink slipped. If pt d/c is prior to 72 hours to call intake. If longer than 72 hours pt will need to return to the [address] [place] [address] in [facility]. Spoke with Alexandra Medrano ([phone]) and discussed this. Spoke with pt son Nguyễn Mathews who reports that she was admitted to [facility] [address] in June and will become long term there. She was sent to [facility] about a weeks ago for medication adjustments. He reports that the yelling out is fairly new for her. He will update his siblings on her being admitted to this hospital.  CM/SW to follow. Alexandra Hu RN, CM.

## 2022-07-18 NOTE — PROGRESS NOTES
Hospitalist Progress Note      PCP: No primary care provider on file. Date of Admission: 7/17/2022  Days in the hospital: 1    Hospital Course:   79-year-old female with past medical history atrial fibrillation on digoxin and Coreg but not on anticoagulation, hypertension, hyperlipidemia bipolar/schizophrenia who [facility] presents due to hypotension and altered mental status from a nursing facility. Nephrology has been consulted    Subjective  Patient seen and examined at bedside. Remains confused, alert, very poor historian, chart reviewed, overnight events reviewed. Exam:    /74   Pulse 78   Temp 97.1 °F (36.2 °C)   Resp 17   SpO2 98%     HEENT: No pallor, no icterus. Respiratory:  CTA, good air entry. Cardiovascular: RRR, no murmur. Abdomen: Soft, non-tender, BS noted. Musculoskeletal: No joint pains or joint swelling noted. Neurologic: awake, alert, moves upper and lower extremities, confused s       Assessment/Plan:  -Acute renal failure  -Altered mental status  -Metabolic acidosis  -Hypotension  -Atrial fibrillation not on ablation  -Dyslipidemia        Plan  1. Continue IV fluids, creatinine improving, follow-up with nephrology, continue to monitor labs  2. May benefit from bicarb drip, follow-up with nephrology, awaiting recommendations  3. Blood pressure improved, continue to monitor  4. Heart rate controlled, not on chronic anticoagulation  5. Continue statin continue IV fluids and mostly for now.          Labs:   Recent Labs     07/17/22  0358 07/18/22  0644   WBC 9.3 7.9   HGB 12.8 11.7   HCT 40.1 36.8    346     Recent Labs     07/17/22  0358 07/18/22  0644    140   K 5.0 5.0    111*   CO2 17* 15*   BUN 80* 69*   CREATININE 4.1* 2.3*   CALCIUM 8.7 8.6     Recent Labs     07/18/22  0644   AST 21   ALT 13   BILITOT 0.5   ALKPHOS 113*     Recent Labs     07/17/22  0517   INR 4.3     No results for input(s): CKTOTAL, TROPONINI in the last 72 hours. Medications:  Reviewed    Infusion Medications    sodium bicarbonate infusion 125 mL/hr at 07/18/22 0953    sodium chloride       Scheduled Medications    sodium chloride flush  10 mL IntraVENous 2 times per day    enoxaparin  40 mg SubCUTAneous Daily    ziprasidone  10 mg IntraMUSCular Once    carvedilol  12.5 mg Oral BID WC    digoxin  125 mcg Oral Daily    [Held by provider] lisinopril  10 mg Oral Daily    OLANZapine  2.5 mg Oral Nightly    rosuvastatin  10 mg Oral QPM     PRN Meds: sodium chloride flush, sodium chloride, promethazine **OR** ondansetron, polyethylene glycol, acetaminophen **OR** acetaminophen, haloperidol, haloperidol lactate, LORazepam      Intake/Output Summary (Last 24 hours) at 7/18/2022 1330  Last data filed at 7/18/2022 0953  Gross per 24 hour   Intake 15.07 ml   Output --   Net 15.07 ml     There is no height or weight on file to calculate BMI. Diet  ADULT DIET; Regular    Code Status  Full Code       Electronically signed by Jameel Inman MD on 7/18/2022 at 1:30 PM  [employer]   Please contact me through perfect serve    NOTE: This report was transcribed using voice recognition software. Every effort was made to ensure accuracy; however, inadvertent computerized transcription errors may be present.

## 2022-07-19 LAB
ANION GAP SERPL CALCULATED.3IONS-SCNC: 11 MMOL/L (ref 7–16)
ANION GAP SERPL CALCULATED.3IONS-SCNC: 14 MMOL/L (ref 7–16)
BUN BLDV-MCNC: 43 MG/DL (ref 6–23)
BUN BLDV-MCNC: 55 MG/DL (ref 6–23)
CALCIUM SERPL-MCNC: 8.6 MG/DL (ref 8.6–10.2)
CALCIUM SERPL-MCNC: 8.7 MG/DL (ref 8.6–10.2)
CHLORIDE BLD-SCNC: 105 MMOL/L (ref 98–107)
CHLORIDE BLD-SCNC: 105 MMOL/L (ref 98–107)
CO2: 23 MMOL/L (ref 22–29)
CO2: 24 MMOL/L (ref 22–29)
CREAT SERPL-MCNC: 1.4 MG/DL (ref 0.5–1)
CREAT SERPL-MCNC: 1.6 MG/DL (ref 0.5–1)
GFR AFRICAN AMERICAN: 37
GFR AFRICAN AMERICAN: 43
GFR NON-AFRICAN AMERICAN: 31 ML/MIN/1.73
GFR NON-AFRICAN AMERICAN: 36 ML/MIN/1.73
GLUCOSE BLD-MCNC: 114 MG/DL (ref 74–99)
GLUCOSE BLD-MCNC: 156 MG/DL (ref 74–99)
HCT VFR BLD CALC: 36.8 % (ref 34–48)
HEMOGLOBIN: 12 G/DL (ref 11.5–15.5)
INR BLD: 2.2
MCH RBC QN AUTO: 28.8 PG (ref 26–35)
MCHC RBC AUTO-ENTMCNC: 32.6 % (ref 32–34.5)
MCV RBC AUTO: 88.5 FL (ref 80–99.9)
PDW BLD-RTO: 13.2 FL (ref 11.5–15)
PLATELET # BLD: 361 E9/L (ref 130–450)
PMV BLD AUTO: 9.3 FL (ref 7–12)
POTASSIUM SERPL-SCNC: 4.1 MMOL/L (ref 3.5–5)
POTASSIUM SERPL-SCNC: 4.7 MMOL/L (ref 3.5–5)
PROTHROMBIN TIME: 24.7 SEC (ref 9.3–12.4)
RBC # BLD: 4.16 E12/L (ref 3.5–5.5)
SODIUM BLD-SCNC: 140 MMOL/L (ref 132–146)
SODIUM BLD-SCNC: 142 MMOL/L (ref 132–146)
URINE CULTURE, ROUTINE: NORMAL
WBC # BLD: 7.3 E9/L (ref 4.5–11.5)

## 2022-07-19 PROCEDURE — 36415 COLL VENOUS BLD VENIPUNCTURE: CPT

## 2022-07-19 PROCEDURE — 85610 PROTHROMBIN TIME: CPT

## 2022-07-19 PROCEDURE — 6370000000 HC RX 637 (ALT 250 FOR IP): Performed by: FAMILY MEDICINE

## 2022-07-19 PROCEDURE — 2580000003 HC RX 258: Performed by: INTERNAL MEDICINE

## 2022-07-19 PROCEDURE — 51702 INSERT TEMP BLADDER CATH: CPT

## 2022-07-19 PROCEDURE — 6370000000 HC RX 637 (ALT 250 FOR IP): Performed by: INTERNAL MEDICINE

## 2022-07-19 PROCEDURE — 6360000002 HC RX W HCPCS: Performed by: FAMILY MEDICINE

## 2022-07-19 PROCEDURE — 80048 BASIC METABOLIC PNL TOTAL CA: CPT

## 2022-07-19 PROCEDURE — 85027 COMPLETE CBC AUTOMATED: CPT

## 2022-07-19 PROCEDURE — 2580000003 HC RX 258: Performed by: FAMILY MEDICINE

## 2022-07-19 PROCEDURE — 1200000000 HC SEMI PRIVATE

## 2022-07-19 RX ORDER — QUETIAPINE FUMARATE 25 MG/1
25 TABLET, FILM COATED ORAL ONCE
Status: COMPLETED | OUTPATIENT
Start: 2022-07-19 | End: 2022-07-19

## 2022-07-19 RX ORDER — DEXTROSE, SODIUM CHLORIDE, SODIUM LACTATE, POTASSIUM CHLORIDE, AND CALCIUM CHLORIDE 5; .6; .31; .03; .02 G/100ML; G/100ML; G/100ML; G/100ML; G/100ML
INJECTION, SOLUTION INTRAVENOUS CONTINUOUS
Status: DISCONTINUED | OUTPATIENT
Start: 2022-07-19 | End: 2022-07-20

## 2022-07-19 RX ADMIN — ROSUVASTATIN CALCIUM 10 MG: 10 TABLET, FILM COATED ORAL at 16:22

## 2022-07-19 RX ADMIN — ACETAMINOPHEN 650 MG: 325 TABLET ORAL at 08:09

## 2022-07-19 RX ADMIN — OLANZAPINE 2.5 MG: 2.5 TABLET, FILM COATED ORAL at 22:14

## 2022-07-19 RX ADMIN — HALOPERIDOL LACTATE 2 MG: 5 INJECTION, SOLUTION INTRAMUSCULAR at 16:23

## 2022-07-19 RX ADMIN — LORAZEPAM 1 MG: 1 TABLET ORAL at 14:22

## 2022-07-19 RX ADMIN — CARVEDILOL 12.5 MG: 6.25 TABLET, FILM COATED ORAL at 16:22

## 2022-07-19 RX ADMIN — CARVEDILOL 12.5 MG: 6.25 TABLET, FILM COATED ORAL at 08:10

## 2022-07-19 RX ADMIN — DIGOXIN 125 MCG: 125 TABLET ORAL at 08:09

## 2022-07-19 RX ADMIN — LORAZEPAM 1 MG: 1 TABLET ORAL at 03:06

## 2022-07-19 RX ADMIN — Medication 10 ML: at 22:14

## 2022-07-19 RX ADMIN — SODIUM CHLORIDE, SODIUM LACTATE, POTASSIUM CHLORIDE, CALCIUM CHLORIDE AND DEXTROSE MONOHYDRATE: 5; 600; 310; 30; 20 INJECTION, SOLUTION INTRAVENOUS at 11:46

## 2022-07-19 RX ADMIN — HALOPERIDOL LACTATE 2 MG: 5 INJECTION, SOLUTION INTRAMUSCULAR at 08:10

## 2022-07-19 RX ADMIN — QUETIAPINE FUMARATE 25 MG: 25 TABLET ORAL at 16:22

## 2022-07-19 NOTE — CONSULTS
7/19/2022 3:30 PM  Service: Urology  Group: [facility] (Kip/Noemy/Faraz)    [facility]  [id number]     Chief Complaint:    7/19/2022 3:37 PM  Service: Urology  Group: [facility] (Kip/Noemy/Faraz)    [facility]  [id number]     Chief Complaint:    Right hydronephrosis    History of Present Illness: The patient is a 82 y.o. female patient who presented to the hospital 2 days ago from a nursing facility for hypotension and altered mental status. She was found to have SHLOMO with serum creatinine of 4.1 on admission. Nephrology was consulted. She underwent a renal ultrasound that showed right hydronephrosis for which we were asked to evaluate. She is a poor historian and unable to provide any accurate medical history at this time. There is no family present. She denies any pain when asked. She is a Jensen catheter indwelling draining yellow urine. This was inserted for unknown reasons with unknown immediate output. History reviewed. No pertinent past medical history. No past surgical history on file. Medications Prior to Admission:    Medications Prior to Admission: carvedilol (COREG) 12.5 MG tablet, Take 12.5 mg by mouth in the morning and 12.5 mg in the evening. Take with meals. digoxin (LANOXIN) 125 MCG tablet, Take 125 mcg by mouth in the morning. lisinopril (PRINIVIL;ZESTRIL) 10 MG tablet, Take 10 mg by mouth in the morning.   OLANZapine (ZYPREXA) 2.5 MG tablet, Take 2.5 mg by mouth nightly  rosuvastatin (CRESTOR) 10 MG tablet, Take 10 mg by mouth every evening  acetaminophen (TYLENOL) 325 MG tablet, Take 650 mg by mouth every 6 hours as needed for Pain  benzocaine-menthol (CEPACOL SORE THROAT) 15-3.6 MG lozenge, Take 1 lozenge by mouth every 2 hours as needed for Sore Throat (cough)  haloperidol lactate (HALDOL) 5 MG/ML injection, Inject 2 mg into the muscle every 8 hours as needed for Agitation  haloperidol (HALDOL) 2 MG tablet, Take 2 mg by mouth every 8 hours as needed (agitation)  LORazepam (ATIVAN) 1 MG tablet, Take 1 mg by mouth every 6 hours as needed for Anxiety (agitation). Allergies:    Patient has no known allergies. Social History:        Family History:   Non-contributory to this Urological problem  family history is not on file. Review of Systems:  Unable to obtain due to confusion    Physical Exam:     Vitals:  BP (!) 166/86   Pulse 80   Temp 97.6 °F (36.4 °C) (Temporal)   Resp 16   Ht 5' 5\" (1.651 m)   Wt 169 lb (76.7 kg)   SpO2 96%   BMI 28.12 kg/m²     General: Awake and alert, confused  HEENT:  Normocephalic, atraumatic. Lungs:  Respirations symmetric and non-labored. Abdomen:  soft, nontender, no masses  Extremities:  No clubbing, cyanosis, or edema  Skin:  Warm and dry, no open lesions or rashes  Neuro: There are no motor or sensory deficits in the 4 quadrant extremities   Rectal: deferred  Genitourinary: Catheter draining miles urine    Labs:     Recent Labs     07/17/22  0358 07/18/22  0644 07/19/22  0447   WBC 9.3 7.9 7.3   RBC 4.53 4.07 4.16   HGB 12.8 11.7 12.0   HCT 40.1 36.8 36.8   MCV 88.5 90.4 88.5   MCH 28.3 28.7 28.8   MCHC 31.9* 31.8* 32.6   RDW 13.3 13.3 13.2    346 361   MPV 9.3 9.2 9.3         Recent Labs     07/18/22  0644 07/18/22  1611 07/19/22  0447   CREATININE 2.3* 1.9* 1.6*       No results found for: PSA    Imaging:   Narrative   EXAMINATION:   RETROPERITONEAL ULTRASOUND OF THE KIDNEYS AND URINARY BLADDER       7/18/2022       COMPARISON:   None       HISTORY:   ORDERING SYSTEM PROVIDED HISTORY: SHLOMO   TECHNOLOGIST PROVIDED HISTORY:       Reason for exam:->SHLOMO   What reading provider will be dictating this exam?->CRC       FINDINGS:       Kidneys: The right kidney measures 11.6 cm in length and the left kidney measures 10.5   cm in length. Kidneys demonstrate normal cortical echogenicity. There is extensive   dilatation identified of the renal pelvis on the right or parapelvic cysts.    There is a nonobstructing stone seen in the left kidney measuring 1.3 cm in   length. No distension seen in the left renal collecting system. The bladder   is incompletely distended. Bladder:       Incomplete distension of the bladder. Impression   there is moderate to severe hydronephrosis of the right kidney or multiple   parapelvic cysts. Nonobstructing stones seen in the left kidney. The bladder is incompletely   distended.        Assessment/plan:  Right hydronephrosis  Nonobstructing left renal calculi  SHLOMO      Continue to watch the creatinine, trending down  Nephrology following  Antibiotics per memory  Renal ultrasound reviewed with right hydronephrosis  Will order CT abdomen pelvis to further evaluate  Will follow  I reviewed the chart and discussed the case with the nurse practitioner agree with the above note and plan

## 2022-07-19 NOTE — PROGRESS NOTES
Physician Progress Note      PATIENT:               Jovanny Gibbs  CSN #:                  [id number]  :                       1935  ADMIT DATE:       2022 2:07 AM  DISCH DATE:  RESPONDING  PROVIDER #:        Tammy Hooper MD          QUERY TEXT:    Pt admitted with SHLOMO. Pt noted to have Altered Mental Status. If possible,   please document in the progress notes and discharge summary if you are   evaluating and / or treating any of the following: The medical record reflects the following:  Risk Factors: Medicated at facility with Ativan and Haldol; Dementia; Hypotension; SHLOMO  Clinical Indicators:  22: Per nephrology:  altered mental status, likely d/t over medication at   skilled facility improved to baseline. 22: Creat 4.1; Treatment: Nephrology consult; IV fluid bolus; IV fluids with Bicarb; holding   lisinopril; laboratory studies; Geodon    Thank You,  Cinthya DUMONTN, R.N.  Clinical Documentation Improvement  [phone]  Options provided:  -- Drug-induced encephalopathy due to, Please specify substance. -- Drug-induced encephalopathy, substance(s) unknown  -- Encephalopathy due to ##please specify cause, please specify cause. -- Metabolic encephalopathy  -- Toxic encephalopathy  -- Toxic metabolic encephalopathy  -- Delirium due to, Please specify cause. -- Delirium  -- Other - I will add my own diagnosis  -- Disagree - Not applicable / Not valid  -- Disagree - Clinically unable to determine / Unknown  -- Refer to Clinical Documentation Reviewer    PROVIDER RESPONSE TEXT:    This patient has toxic encephalopathy.     Query created by: Jo Duncan on 2022 7:10 AM      Electronically signed by:  Tammy Hooper MD 2022 4:52 PM

## 2022-07-19 NOTE — PROGRESS NOTES
Department of Internal Medicine  Nephrology Progress Note    Events reviewed. SUBJECTIVE:  We are following this patient for SHLOMO and HAGMA. The patient is resting comfortably and has no complaints.      Physical Exam:    VITALS:  BP (!) 166/86   Pulse 80   Temp 97.6 °F (36.4 °C) (Temporal)   Resp 16   Ht 5' 5\" (1.651 m)   Wt 169 lb (76.7 kg)   SpO2 96%   BMI 28.12 kg/m²   24HR INTAKE/OUTPUT:    Intake/Output Summary (Last 24 hours) at 7/19/2022 1324  Last data filed at 7/19/2022 0545  Gross per 24 hour   Intake 938.84 ml   Output 800 ml   Net 138.84 ml       Constitutional: Awake, alert, NAD  HEENT: PERRL, normocephalic, atraumatic  Respiratory: CTA B, normal respirations  Cardiovascular/Edema: RRR, S1/S2, no edema noted on exam  Gastrointestinal: Abdomen soft, nontender  Neurologic: Confused, baseline  Skin: Warm, dry, no rash or lesion    Scheduled Meds:   sodium chloride flush  10 mL IntraVENous 2 times per day    carvedilol  12.5 mg Oral BID WC    digoxin  125 mcg Oral Daily    [Held by provider] lisinopril  10 mg Oral Daily    OLANZapine  2.5 mg Oral Nightly    rosuvastatin  10 mg Oral QPM     Continuous Infusions:   dextrose 5% in lactated ringers 75 mL/hr at 07/19/22 1146    sodium chloride       PRN Meds:.sodium chloride flush, sodium chloride, promethazine **OR** ondansetron, polyethylene glycol, acetaminophen **OR** acetaminophen, haloperidol, haloperidol lactate, LORazepam    DATA:    CBC with Differential:    Lab Results   Component Value Date/Time    WBC 7.3 07/19/2022 04:47 AM    RBC 4.16 07/19/2022 04:47 AM    HGB 12.0 07/19/2022 04:47 AM    HCT 36.8 07/19/2022 04:47 AM     07/19/2022 04:47 AM    MCV 88.5 07/19/2022 04:47 AM    MCH 28.8 07/19/2022 04:47 AM    MCHC 32.6 07/19/2022 04:47 AM    RDW 13.2 07/19/2022 04:47 AM    LYMPHOPCT 17.0 07/18/2022 06:44 AM    MONOPCT 7.4 07/18/2022 06:44 AM    BASOPCT 0.9 07/18/2022 06:44 AM    MONOSABS 0.58 07/18/2022 06:44 AM    LYMPHSABS 1.34 OFFICE VISIT      Patient: Lucy Romero Date of Service: 3/11/2019   : 1948 MRN: [id number]     SUBJECTIVE:   HISTORY OF PRESENT ILLNESS:      ACTIVE PROBLEM LIST:  Patient Active Problem List   Diagnosis   • Type 2 diabetes mellitus without complication, without long-term current use of insulin (CMS/HCC)   • Need for shingles vaccine   • Cerebral artery occlusion with cerebral infarction (CMS/HCC)   • Cardiomyopathy (CMS/HCC)   • Hemianopsia   • Macular degeneration   • Atherosclerotic heart disease of native coronary 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 angina pectoris   • Benign essential hypertension   • Health maintenance examination   • Sebaceous cyst       f/up chronic dz     macular degeneration - has f/up with Dr Daugherty retina     HYPERTENSION  Taking medication regularly: Yes   monitors occasionally  Side effects: No  Headaches: No Lightheadedness: No Vision changes: No  Cough: No  Monitors blood pressure at home: Yes   Fatigue: No  Sexual dysfunction: No  Constipation: No     Cardiomyopathy- saw [facility] few months ago - stable  denies CP, SOB, no edema  walks grandson to and from school as only exercise  New grandson 4 wks ago - Ian     h/o CVA- on coumadin  no driving since stroke affected vision     Menopause-mid 50's     DIABETES  Monitors blood sugar at home: Yes   Average fasting blood sugar: fasting 130's  Taking medicine regularly: Yes  Side effects: No  Opthalmology visit within the last year: Yes   Depressed mood: No Anhedonia : No  Taking daily aspirin: Yes  Flu shot up to date: Yes Pneumovax up to date: Yes  Chest pain: No Lightheadedness : No Palpations: No  Foot pain: No Foot ulcers: No  Feet self assessment: Yes   Exercising regularly: No  Watching diet: Yes Classes Attended: No   Smoker: No Exposed to second-hand smoke: No  Hemoglobin A1C (%)   Date Value   2018 7.5 (H)     Sebaceous cyst on upper back growing - wants removed          PAST MEDICAL HISTORY:  Past Medical History:    Diagnosis Date   • Cataract        MEDICATIONS:  Current Outpatient Medications   Medication Sig   • atorvastatin (LIPITOR) 40 MG tablet TAKE 1 TABLET AT BEDTIME   • carvedilol (COREG) 12.5 MG tablet Take 1 tablet by mouth 2 times daily (with meals). TAKE 1 TABLET BY MOUTH TWICE DAILY   • losartan-hydrochlorothiazide (HYZAAR) 100-25 MG per tablet Take 1 tablet by mouth daily. TAKE 1 TABLET DAILY   • metFORMIN (GLUCOPHAGE) 500 MG tablet Take 1 tablet by mouth daily.   • warfarin (COUMADIN) 5 MG tablet Take 1 tablet by mouth daily. TAKE 1 TABLET BY MOUTH DAILY   • Blood Glucose Monitoring Suppl (MIKE CONTOUR NEXT EZ) w/Device Kit use as directed   • blood glucose (MIKE CONTOUR NEXT TEST) test strip TEST DAILY   • MICROLET LANCETS Misc TEST ONCE DAILY   • B Complex-Folic Acid (SUPER B COMPLEX MAXI) Tab TAKE 1 TABLET DAILY   • Ascorbic Acid (VITAMIN C) 1000 MG tablet TAKE 1 TABLET DAILY   • Cholecalciferol (VITAMIN D3) 1000 units capsule TAKE 1 CAPSULE DAILY     No current facility-administered medications for this visit.        ALLERGIES:  ALLERGIES:  No Known Allergies    PAST SURGICAL HISTORY:  History reviewed. No pertinent surgical history.    FAMILY HISTORY:  Family History   Problem Relation Age of Onset   • Stroke Other        SOCIAL HISTORY:  Social History     Tobacco Use   • Smoking status: Former Smoker     Last attempt to quit: 2009     Years since quitting: 10.1   • Smokeless tobacco: Former User   Substance Use Topics   • Alcohol use: Yes   • Drug use: Not on file       Past medical, surgical, social histories, and medications and allergies reviewed today.  10 point ROS is reviewed and negative.    OBJECTIVE:     Visit Vitals  /79 ([place] [facility] [place]: Sitting, Cuff Size: Large Adult)   Pulse 66   Temp 97.8 °F (36.6 °C) (Temporal)   Ht 5' 7\" (1.702 m)   Wt 96.5 kg (212 lb 12.8 oz)   BMI 33.33 kg/m²       Physical Exam      CONSTITUTIONAL: alert, in no acute distress and current  07/18/2022 06:44 AM    EOSABS 0.26 07/18/2022 06:44 AM    BASOSABS 0.07 07/18/2022 06:44 AM     CMP:    Lab Results   Component Value Date/Time     07/19/2022 04:47 AM    K 4.1 07/19/2022 04:47 AM    K 5.0 07/18/2022 06:44 AM     07/19/2022 04:47 AM    CO2 24 07/19/2022 04:47 AM    BUN 55 07/19/2022 04:47 AM    CREATININE 1.6 07/19/2022 04:47 AM    GFRAA 37 07/19/2022 04:47 AM    LABGLOM 31 07/19/2022 04:47 AM    GLUCOSE 114 07/19/2022 04:47 AM    PROT 6.9 07/18/2022 06:44 AM    LABALBU 2.8 07/18/2022 06:44 AM    CALCIUM 8.6 07/19/2022 04:47 AM    BILITOT 0.5 07/18/2022 06:44 AM    ALKPHOS 113 07/18/2022 06:44 AM    AST 21 07/18/2022 06:44 AM    ALT 13 07/18/2022 06:44 AM     Magnesium:  No results found for: MG  Phosphorus:  No results found for: PHOS  Radiology Review:        Renal US  July 19, 2022   there is moderate to severe hydronephrosis of the right kidney or multiple   parapelvic cysts. Nonobstructing stones seen in the left kidney. The bladder is incompletely   distended. BRIEF SUMMARY OF INITIAL CONSULT:       Briefly, Ms. Olimpia Vinson, is an 57-year-old female with a past medical history of HTN, AF, HLD, dementia, and bipolar/schizophrenia, who was admitted after presenting to the ER on July 17, 2022 from [facility]\"" with fatigue and hypotension from possible oversedation. Ms. Ellyn Pablo was given haldol and ativan approximately 24 hours before arrival to the ED. Labs on admission were significant for sodium 139 potassium 5.0, chloride 101, bicarb 17, BUN 80, creatinine 4.1, and anion gap 21. We are consulted for SHLOMO and metabolic acidosis. Current home medications include carvedilol, digoxin, lisinopril, olanzapine, rosuvastatin, ativan and haldol. Problems resolved. Hypotension  HAGMA, multifactorial, 2/2 SHLOMO and uremia with a component of starvation ketosis.         IMPRESSION/RECOMMENDATIONS:       SHLOMO stage III, volume responsive pre-renal SHLOMO from poor oral intake in the setting of ACEi administration. On admission her creatinine was 4.1 mg/dL, that has improved to 1.6 mg/dL. Renal Us demonstrates moderate to severe right hydronephrosis or multiple parapelvic cysts. Will change IVF to D5LR at 75 cc/hr. Right hydronephrosis versus multiple parapelvic cyst, to obtain a renal flow scan and consult urology. HTN, on carvedilol, lisinopril on hold    ------------------------------------------------------------------  Altered mental status, likely d/t over medication at [facility] facility, improved to baseline  Atrial fibrillation, rate controlled, not on chronic anticoagulation.  On digoxin  HLD, on statin  Dementia  Bipolar/schitzophrenia, on olanzapine and ziprasidone  Nutrition, regular diet        Plan:     Stop Sodium bicarbonate drip and start D5LR at 75 cc/hr  Obtain renal flow scan  Continue to hold lisinopril  Continue to monitor renal function daily, repeat BMP 4pm  Continue to monitor BP, avoid hypotension  Continue strict I&O  Consult urology    Electronically signed by JESSICA De Leon CNP on 7/19/2022 at 3:06 PM vital signs reviewed.   HEENT: atraumatic and normocephalic.   PULMONARY: normal respiratory rate and effort, breath sounds clear to auscultation bilaterally  CV: normal rate, regular rhythm, normal S1, normal S2 and edema was not present in the lower extremities.   PSYCH: alert and awake, interactive and mood/affect were appropriate.   SKIN, HAIR, NAILS: normal skin color and pigmentation. 2cm midline upper back cystic structure; nontender, nonerythematous    Assessment AND PLAN:       Problem List Items Addressed This Visit        Circulatory    Atherosclerotic heart disease of native coronary 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 angina pectoris     Controlled; annual cards f/up         Relevant Medications    atorvastatin (LIPITOR) 40 MG tablet    carvedilol (COREG) 12.5 MG tablet    losartan-hydrochlorothiazide (HYZAAR) 100-25 MG per tablet    warfarin (COUMADIN) 5 MG tablet       Integumentary    Sebaceous cyst     Will f/up for removal            Endocrine    Type 2 diabetes mellitus without complication, without long-term current use of insulin (CMS/HCC) - Primary     DM - new dx 2014 -   Metformin since 2018  went to DM classes,ophtho referral today           Relevant Orders    SERVICE TO OPHTHALMOLOGY    LIPID PANEL WITH REFLEX    COMPREHENSIVE METABOLIC PANEL    MICROALBUMIN URINE RANDOM    GLYCOHEMOGLOBIN       Other    Need for shingles vaccine    Relevant Orders    ZOSTER SHINGLES RECOMBINANT ADJUVANTED IM(SHINGRIX) (Completed)    Health maintenance examination                Relevant Orders    MAMMO SCREENING BILATERAL W JOSE L          Problem   Type 2 Diabetes Mellitus Without Complication, Without Long-Term Current Use of Insulin (Cms/Hcc)   Need for Shingles Vaccine   Health Maintenance Examination    Hm - mammo april 2018 benign - repeat due april 2019 - ordered  pap normal 2012 & 2009 - will stop given age  c-scope may 2015 - with multiple polyps - repeat 5 yrs (2020)   dexa march 2015 - normal - repeat 3-5yrs      Sebaceous Cyst   Macular Degeneration   Atherosclerotic Heart Disease of Native Coronary Artery Without Angina Pectoris     twave alternans negative 10/08 , low risk of scd/vt. [facility] [place]; Cath sept 2008, lad      occluded but with collaterals.     Cardiomyopathy (Cms/Hcc)    abn echo Ef 35%, QS v1-5 on ekg. Twave negative in october 2008      Possible source of cardioembolic stroke     Cerebral Artery Occlusion With Cerebral Infarction (Cms/Hcc)    05/2008 - presented with hemianopsia - R occipital cortical stroke that is cryptogenic. She is an ASA failure.  Some residual visual loss  neg MRA neck; no other findings MRI -2008      neuro - [facility]     Benign Essential Hypertension   Hemianopsia    Homonymous Hemianopsia   · bilat eyes, left field           F/up 1-2 mo for cyst  6 mo other chronic dz    Instructions provided as documented in the AVS.    The patient indicated understanding of the diagnosis and agreed with the plan of care.

## 2022-07-19 NOTE — PROGRESS NOTES
Perfect serve sent to  requesting additional mediations for patient as her nurse reports she is agitated and pulling at velazquez.

## 2022-07-19 NOTE — PROGRESS NOTES
Contacted telesitter - no alarms or calls on this patient. IV pulled out, pulling on velazquez.   Telesitter office states \"we are very busy and we are sorry we missed it\"

## 2022-07-19 NOTE — PROGRESS NOTES
Sully serve sent to  to see if patient will be ready for discharge soon. Erik Denver said patient should be able to discharge tomorrow.

## 2022-07-19 NOTE — CARE COORDINATION
Social Work Discharge Planning:  Patient is from [facility] Patient was not pink slipped. SW spoke with [place] from [facility] intake who relayed, the patient will have to return within 72 hours of leaving there facility, patient will have to return before 7/20 1:45 AM if the patient is discharged before then process start over . The patient will have have to be seen by psychiatry here and pink slipped  or sign in voluntary. If patient is not pink slipped or sign  in voluntary, patient will need to return to [address] [place] [address] in [facility]. The  at the facility name is Aimee [phone]. SW following and will assist with transition of care.   Electronically signed by GREG Becerril on 7/19/2022 at 1:40 PM

## 2022-07-19 NOTE — PROGRESS NOTES
07/18/22  0644   AST 21   ALT 13   BILITOT 0.5   ALKPHOS 113*     Recent Labs     07/17/22  0517 07/19/22  0447   INR 4.3 2.2     No results for input(s): Janice Ugalde in the last 72 hours. Medications:  Reviewed    Infusion Medications    dextrose 5% in lactated ringers 75 mL/hr at 07/19/22 1146    sodium chloride       Scheduled Medications    sodium chloride flush  10 mL IntraVENous 2 times per day    carvedilol  12.5 mg Oral BID WC    digoxin  125 mcg Oral Daily    [Held by provider] lisinopril  10 mg Oral Daily    OLANZapine  2.5 mg Oral Nightly    rosuvastatin  10 mg Oral QPM     PRN Meds: sodium chloride flush, sodium chloride, promethazine **OR** ondansetron, polyethylene glycol, acetaminophen **OR** acetaminophen, haloperidol, haloperidol lactate, LORazepam      Intake/Output Summary (Last 24 hours) at 7/19/2022 1245  Last data filed at 7/19/2022 0545  Gross per 24 hour   Intake 938.84 ml   Output 800 ml   Net 138.84 ml     Body mass index is 28.12 kg/m². Diet  ADULT DIET; Regular    Code Status  Full Code         Electronically signed by Carissa Riojas MD on 7/19/2022 at 12:45 PM  [employer]   Please contact me through perfect serve    NOTE: This report was transcribed using voice recognition software. Every effort was made to ensure accuracy; however, inadvertent computerized transcription errors may be present.

## 2022-07-20 ENCOUNTER — APPOINTMENT (OUTPATIENT)
Dept: NUCLEAR MEDICINE | Age: 87
DRG: 659 | End: 2022-07-20
Payer: COMMERCIAL

## 2022-07-20 ENCOUNTER — APPOINTMENT (OUTPATIENT)
Dept: CT IMAGING | Age: 87
DRG: 659 | End: 2022-07-20
Payer: COMMERCIAL

## 2022-07-20 LAB
ACINETOBACTER CALCOAC BAUMANNII COMPLEX BY PCR: NOT DETECTED
ANION GAP SERPL CALCULATED.3IONS-SCNC: 10 MMOL/L (ref 7–16)
BACTEROIDES FRAGILIS BY PCR: NOT DETECTED
BOTTLE TYPE: ABNORMAL
BUN BLDV-MCNC: 33 MG/DL (ref 6–23)
CALCIUM SERPL-MCNC: 8.9 MG/DL (ref 8.6–10.2)
CANDIDA ALBICANS BY PCR: NOT DETECTED
CANDIDA AURIS BY PCR: NOT DETECTED
CANDIDA GLABRATA BY PCR: NOT DETECTED
CANDIDA KRUSEI BY PCR: NOT DETECTED
CANDIDA PARAPSILOSIS BY PCR: NOT DETECTED
CANDIDA TROPICALIS BY PCR: NOT DETECTED
CHLORIDE BLD-SCNC: 105 MMOL/L (ref 98–107)
CO2: 27 MMOL/L (ref 22–29)
CREAT SERPL-MCNC: 1.3 MG/DL (ref 0.5–1)
CRYPTOCOCCUS NEOFORMANS/GATTII BY PCR: NOT DETECTED
ENTEROBACTER CLOACAE COMPLEX BY PCR: NOT DETECTED
ENTEROBACTERALES BY PCR: NOT DETECTED
ENTEROCOCCUS FAECALIS BY PCR: NOT DETECTED
ENTEROCOCCUS FAECIUM BY PCR: NOT DETECTED
ESCHERICHIA COLI BY PCR: NOT DETECTED
GFR AFRICAN AMERICAN: 47
GFR NON-AFRICAN AMERICAN: 39 ML/MIN/1.73
GLUCOSE BLD-MCNC: 115 MG/DL (ref 74–99)
HAEMOPHILUS INFLUENZAE BY PCR: NOT DETECTED
KLEBSIELLA AEROGENES BY PCR: NOT DETECTED
KLEBSIELLA OXYTOCA BY PCR: NOT DETECTED
KLEBSIELLA PNEUMONIAE GROUP BY PCR: NOT DETECTED
LISTERIA MONOCYTOGENES BY PCR: NOT DETECTED
NEISSERIA MENINGITIDIS BY PCR: NOT DETECTED
ORDER NUMBER: ABNORMAL
POTASSIUM SERPL-SCNC: 4.4 MMOL/L (ref 3.5–5)
PROTEUS SPECIES BY PCR: NOT DETECTED
PSEUDOMONAS AERUGINOSA BY PCR: NOT DETECTED
SALMONELLA SPECIES BY PCR: NOT DETECTED
SERRATIA MARCESCENS BY PCR: NOT DETECTED
SODIUM BLD-SCNC: 142 MMOL/L (ref 132–146)
SOURCE OF BLOOD CULTURE: ABNORMAL
STAPHYLOCOCCUS AUREUS BY PCR: NOT DETECTED
STAPHYLOCOCCUS EPIDERMIDIS BY PCR: NOT DETECTED
STAPHYLOCOCCUS LUGDUNENSIS BY PCR: NOT DETECTED
STAPHYLOCOCCUS SPECIES BY PCR: NOT DETECTED
STENOTROPHOMONAS MALTOPHILIA BY PCR: NOT DETECTED
STREPTOCOCCUS AGALACTIAE BY PCR: NOT DETECTED
STREPTOCOCCUS PNEUMONIAE BY PCR: NOT DETECTED
STREPTOCOCCUS PYOGENES  BY PCR: NOT DETECTED
STREPTOCOCCUS SPECIES BY PCR: DETECTED

## 2022-07-20 PROCEDURE — A9562 TC99M MERTIATIDE: HCPCS | Performed by: RADIOLOGY

## 2022-07-20 PROCEDURE — 3430000000 HC RX DIAGNOSTIC RADIOPHARMACEUTICAL: Performed by: RADIOLOGY

## 2022-07-20 PROCEDURE — 36415 COLL VENOUS BLD VENIPUNCTURE: CPT

## 2022-07-20 PROCEDURE — 80048 BASIC METABOLIC PNL TOTAL CA: CPT

## 2022-07-20 PROCEDURE — 78708 K FLOW/FUNCT IMAGE W/DRUG: CPT | Performed by: RADIOLOGY

## 2022-07-20 PROCEDURE — 2580000003 HC RX 258: Performed by: FAMILY MEDICINE

## 2022-07-20 PROCEDURE — 6370000000 HC RX 637 (ALT 250 FOR IP): Performed by: FAMILY MEDICINE

## 2022-07-20 PROCEDURE — 78708 K FLOW/FUNCT IMAGE W/DRUG: CPT

## 2022-07-20 PROCEDURE — 2580000003 HC RX 258: Performed by: NURSE PRACTITIONER

## 2022-07-20 PROCEDURE — 1200000000 HC SEMI PRIVATE

## 2022-07-20 PROCEDURE — 6360000002 HC RX W HCPCS: Performed by: INTERNAL MEDICINE

## 2022-07-20 PROCEDURE — 74176 CT ABD & PELVIS W/O CONTRAST: CPT

## 2022-07-20 PROCEDURE — 2580000003 HC RX 258: Performed by: INTERNAL MEDICINE

## 2022-07-20 PROCEDURE — 87040 BLOOD CULTURE FOR BACTERIA: CPT

## 2022-07-20 RX ORDER — DEXTROSE AND SODIUM CHLORIDE 5; .9 G/100ML; G/100ML
INJECTION, SOLUTION INTRAVENOUS CONTINUOUS
Status: DISCONTINUED | OUTPATIENT
Start: 2022-07-20 | End: 2022-07-22

## 2022-07-20 RX ORDER — FUROSEMIDE 10 MG/ML
10 INJECTION INTRAMUSCULAR; INTRAVENOUS ONCE
Status: COMPLETED | OUTPATIENT
Start: 2022-07-20 | End: 2022-07-20

## 2022-07-20 RX ADMIN — FUROSEMIDE 10 MG: 10 INJECTION INTRAMUSCULAR; INTRAVENOUS at 14:00

## 2022-07-20 RX ADMIN — ROSUVASTATIN CALCIUM 10 MG: 10 TABLET, FILM COATED ORAL at 18:14

## 2022-07-20 RX ADMIN — CARVEDILOL 12.5 MG: 6.25 TABLET, FILM COATED ORAL at 09:44

## 2022-07-20 RX ADMIN — Medication 10 ML: at 09:47

## 2022-07-20 RX ADMIN — DEXTROSE AND SODIUM CHLORIDE: 5; 900 INJECTION, SOLUTION INTRAVENOUS at 15:34

## 2022-07-20 RX ADMIN — CARVEDILOL 12.5 MG: 6.25 TABLET, FILM COATED ORAL at 18:14

## 2022-07-20 RX ADMIN — OLANZAPINE 2.5 MG: 2.5 TABLET, FILM COATED ORAL at 20:35

## 2022-07-20 RX ADMIN — DIGOXIN 125 MCG: 125 TABLET ORAL at 09:44

## 2022-07-20 RX ADMIN — SODIUM CHLORIDE, SODIUM LACTATE, POTASSIUM CHLORIDE, CALCIUM CHLORIDE AND DEXTROSE MONOHYDRATE: 5; 600; 310; 30; 20 INJECTION, SOLUTION INTRAVENOUS at 12:51

## 2022-07-20 RX ADMIN — Medication 8 MILLICURIE: at 13:20

## 2022-07-20 ASSESSMENT — PAIN SCALES - GENERAL: PAINLEVEL_OUTOF10: 0

## 2022-07-20 NOTE — PROGRESS NOTES
Department of Internal Medicine  Nephrology Progress Note    Events reviewed. SUBJECTIVE:  We are following this patient for SHLOMO and HAGMA. The patient is resting comfortably and has no complaints.      Physical Exam:    VITALS:  /71   Pulse 96   Temp 97.3 °F (36.3 °C) (Temporal)   Resp 18   Ht 5' 5\" (1.651 m)   Wt 169 lb (76.7 kg)   SpO2 93%   BMI 28.12 kg/m²   24HR INTAKE/OUTPUT:    Intake/Output Summary (Last 24 hours) at 7/20/2022 1346  Last data filed at 7/20/2022 0500  Gross per 24 hour   Intake 321.16 ml   Output 1450 ml   Net -1128.84 ml         Constitutional: Awake, alert, NAD  HEENT: PERRL, normocephalic, atraumatic  Respiratory: CTA B, normal respirations  Cardiovascular/Edema: RRR, S1/S2, no edema noted on exam  Gastrointestinal: Abdomen soft, nontender  Neurologic: Confused, baseline  Skin: Warm, dry, no rash or lesion    Scheduled Meds:   furosemide  10 mg IntraVENous Once    sodium chloride flush  10 mL IntraVENous 2 times per day    carvedilol  12.5 mg Oral BID WC    digoxin  125 mcg Oral Daily    [Held by provider] lisinopril  10 mg Oral Daily    OLANZapine  2.5 mg Oral Nightly    rosuvastatin  10 mg Oral QPM     Continuous Infusions:   dextrose 5% in lactated ringers 75 mL/hr at 07/20/22 1251    sodium chloride       PRN Meds:.sodium chloride flush, sodium chloride, promethazine **OR** ondansetron, polyethylene glycol, acetaminophen **OR** acetaminophen, haloperidol, haloperidol lactate, LORazepam    DATA:    CBC with Differential:    Lab Results   Component Value Date/Time    WBC 7.3 07/19/2022 04:47 AM    RBC 4.16 07/19/2022 04:47 AM    HGB 12.0 07/19/2022 04:47 AM    HCT 36.8 07/19/2022 04:47 AM     07/19/2022 04:47 AM    MCV 88.5 07/19/2022 04:47 AM    MCH 28.8 07/19/2022 04:47 AM    MCHC 32.6 07/19/2022 04:47 AM    RDW 13.2 07/19/2022 04:47 AM    LYMPHOPCT 17.0 07/18/2022 06:44 AM    MONOPCT 7.4 07/18/2022 06:44 AM    BASOPCT 0.9 07/18/2022 06:44 AM    MONOSABS 0.58 possible oversedation. Ms. Kevyn Koo was given haldol and ativan approximately 24 hours before arrival to the ED. Labs on admission were significant for sodium 139 potassium 5.0, chloride 101, bicarb 17, BUN 80, creatinine 4.1, and anion gap 21. We are consulted for SHLOMO and metabolic acidosis. Current home medications include carvedilol, digoxin, lisinopril, olanzapine, rosuvastatin, ativan and haldol. Problems resolved. Hypotension  HAGMA, multifactorial, 2/2 SHLOMO and uremia with a component of starvation ketosis. IMPRESSION/RECOMMENDATIONS:       SHLOMO stage III, volume responsive pre-renal SHLOMO from poor oral intake in the setting of ACEi administration. On admission her creatinine was 4.1 mg/dL, that has improved to 1.3 mg/dL. Change IVF to  D5 NS at 75 cc/hr. Right hydronephrosis versus multiple parapelvic cyst, await renal flow scan. CT AP demonstrates dilation of the right renal pelvis suggesting congenital versus aquired UPJ obstruction likely long-standing. Await urology recommendations  HTN, on carvedilol, lisinopril on hold    ------------------------------------------------------------------  Altered mental status, likely d/t over medication at skilled facility, improved to baseline  Atrial fibrillation, rate controlled, not on chronic anticoagulation.  On digoxin  HLD, on statin  Dementia  Bipolar/schitzophrenia, on olanzapine and ziprasidone  Nutrition, regular diet        Plan:     Change IVF D5 NS at 75 cc/hr  Await renal flow scan  Continue to hold lisinopril  Continue to monitor renal function daily  Continue to monitor BP, avoid hypotension  Continue strict I&O      Electronically signed by JESSICA Garcia CNP on 7/20/2022 at 1:46 PM

## 2022-07-20 NOTE — PROGRESS NOTES
Hospitalist Progress Note      PCP: No primary care provider on file. Date of Admission: 7/17/2022  Days in the hospital: 3    Hospital Course:   82-year-old female with past medical history atrial fibrillation on digoxin and Coreg but not on anticoagulation, hypertension, hyperlipidemia bipolar/schizophrenia [facility] due to hypotension and altered mental status from a nursing facility. Nephrology has been consulted, patient was started on bicarb drip. Renal function improved. Ultrasound of the kidney showed moderate to severe right hydronephrosis and urology was consulted. Subjective  Patient seen and examined at bedside, had CT abdomen which showed dilation of right renal pelvis concerning for congenital versus acquired UPJ obstruction. Urology following. Renal flow scan done and results pending. Patient is alert, remains forgetful and confused, appears calmer today. Denies any headache, dizziness, chest pain. Exam:    /71   Pulse 96   Temp 97.3 °F (36.3 °C) (Temporal)   Resp 18   Ht 5' 5\" (1.651 m)   Wt 169 lb (76.7 kg)   SpO2 93%   BMI 28.12 kg/m²     HEENT: No pallor, no icterus. Respiratory:  CTA, good air entry. Cardiovascular: RRR, no murmur. Abdomen: Soft, non-tender, BS noted. Musculoskeletal: No joint pains or joint swelling noted. Neurologic: awake, alert, moves upper and lower extremities, confused     Assessment/Plan:  -Acute renal failure  -Altered mental status  -Metabolic acidosis  -Hypotension  -Atrial fibrillation not on anticoagulation  -Dyslipidemia  -Right hydronephrosis        Plan  1. Continue IV fluids, creatinine improving, follow-up with nephrology, continue to monitor labs, hold nephrotoxic medications  2. Metabolic acidosis improved, currently off bicarb drip  3. Blood pressure stable, continue to monitor  4.   Heart rate controlled, INR was supratherapeutic but Coumadin was not on her med rec, INR trending down  5.  CT abdomen reviewed, follow-up with urology        Labs:   Recent Labs     07/18/22  0644 07/19/22  0447   WBC 7.9 7.3   HGB 11.7 12.0   HCT 36.8 36.8    361     Recent Labs     07/19/22  0447 07/19/22  1635 07/20/22  0502    142 142   K 4.1 4.7 4.4    105 105   CO2 24 23 27   BUN 55* 43* 33*   CREATININE 1.6* 1.4* 1.3*   CALCIUM 8.6 8.7 8.9     Recent Labs     07/18/22  0644   AST 21   ALT 13   BILITOT 0.5   ALKPHOS 113*     Recent Labs     07/19/22  0447   INR 2.2     No results for input(s): Kristy Hunter in the last 72 hours. Medications:  Reviewed    Infusion Medications    dextrose 5 % and 0.9 % NaCl 75 mL/hr at 07/20/22 1534    sodium chloride       Scheduled Medications    sodium chloride flush  10 mL IntraVENous 2 times per day    carvedilol  12.5 mg Oral BID WC    digoxin  125 mcg Oral Daily    [Held by provider] lisinopril  10 mg Oral Daily    OLANZapine  2.5 mg Oral Nightly    rosuvastatin  10 mg Oral QPM     PRN Meds: sodium chloride flush, sodium chloride, promethazine **OR** ondansetron, polyethylene glycol, acetaminophen **OR** acetaminophen, haloperidol, haloperidol lactate, LORazepam      Intake/Output Summary (Last 24 hours) at 7/20/2022 1604  Last data filed at 7/20/2022 1433  Gross per 24 hour   Intake 120 ml   Output 1000 ml   Net -880 ml     Body mass index is 28.12 kg/m². Diet  ADULT DIET; Regular    Code Status  Full Code       Electronically signed by Marta Shannon MD on 7/20/2022 at 4:04 PM  [employer]   Please contact me through perfect serve    NOTE: This report was transcribed using voice recognition software. Every effort was made to ensure accuracy; however, inadvertent computerized transcription errors may be present.

## 2022-07-20 NOTE — PLAN OF CARE
Problem: Discharge Planning  Goal: Discharge to home or other facility with appropriate resources  Outcome: Progressing  Flowsheets (Taken 7/19/2022 2200)  Discharge to home or other facility with appropriate resources:   Identify barriers to discharge with patient and caregiver   Arrange for needed discharge resources and transportation as appropriate     Problem: Skin/Tissue Integrity  Goal: Absence of new skin breakdown  Description: 1. Monitor for areas of redness and/or skin breakdown  2. Assess vascular access sites hourly  3. Every 4-6 hours minimum:  Change oxygen saturation probe site  4. Every 4-6 hours:  If on nasal continuous positive airway pressure, respiratory therapy assess nares and determine need for appliance change or resting period.   Outcome: Progressing     Problem: ABCDS Injury Assessment  Goal: Absence of physical injury  Outcome: Progressing     Problem: Safety - Adult  Goal: Free from fall injury  Outcome: Progressing

## 2022-07-20 NOTE — CARE COORDINATION
7/20/2022 social work transition of care planning  Sw spoke with Aime[facility] at [facility], pt is able to return once medically stable.   Electronically signed by GREG Conroy on 7/20/2022 at 4:15 PM

## 2022-07-21 ENCOUNTER — ANESTHESIA (OUTPATIENT)
Dept: OPERATING ROOM | Age: 87
DRG: 659 | End: 2022-07-21
Payer: COMMERCIAL

## 2022-07-21 ENCOUNTER — APPOINTMENT (OUTPATIENT)
Dept: GENERAL RADIOLOGY | Age: 87
DRG: 659 | End: 2022-07-21
Payer: COMMERCIAL

## 2022-07-21 ENCOUNTER — ANESTHESIA EVENT (OUTPATIENT)
Dept: OPERATING ROOM | Age: 87
DRG: 659 | End: 2022-07-21
Payer: COMMERCIAL

## 2022-07-21 LAB
ANION GAP SERPL CALCULATED.3IONS-SCNC: 8 MMOL/L (ref 7–16)
BUN BLDV-MCNC: 26 MG/DL (ref 6–23)
CALCIUM SERPL-MCNC: 8.6 MG/DL (ref 8.6–10.2)
CHLORIDE BLD-SCNC: 109 MMOL/L (ref 98–107)
CO2: 26 MMOL/L (ref 22–29)
CREAT SERPL-MCNC: 1.1 MG/DL (ref 0.5–1)
CULTURE, BLOOD 2: ABNORMAL
GFR AFRICAN AMERICAN: 57
GFR NON-AFRICAN AMERICAN: 47 ML/MIN/1.73
GLUCOSE BLD-MCNC: 124 MG/DL (ref 74–99)
HCT VFR BLD CALC: 42.3 % (ref 34–48)
HEMOGLOBIN: 12.7 G/DL (ref 11.5–15.5)
INR BLD: 1.4
MCH RBC QN AUTO: 28.8 PG (ref 26–35)
MCHC RBC AUTO-ENTMCNC: 30 % (ref 32–34.5)
MCV RBC AUTO: 95.9 FL (ref 80–99.9)
ORGANISM: ABNORMAL
PDW BLD-RTO: 13.7 FL (ref 11.5–15)
PLATELET # BLD: 264 E9/L (ref 130–450)
PMV BLD AUTO: 9.3 FL (ref 7–12)
POTASSIUM SERPL-SCNC: 4.2 MMOL/L (ref 3.5–5)
PROTHROMBIN TIME: 14.8 SEC (ref 9.3–12.4)
RBC # BLD: 4.41 E12/L (ref 3.5–5.5)
SODIUM BLD-SCNC: 143 MMOL/L (ref 132–146)
WBC # BLD: 6.4 E9/L (ref 4.5–11.5)

## 2022-07-21 PROCEDURE — 6370000000 HC RX 637 (ALT 250 FOR IP): Performed by: FAMILY MEDICINE

## 2022-07-21 PROCEDURE — 2580000003 HC RX 258: Performed by: UROLOGY

## 2022-07-21 PROCEDURE — 2580000003 HC RX 258: Performed by: NURSE ANESTHETIST, CERTIFIED REGISTERED

## 2022-07-21 PROCEDURE — 0TJB8ZZ INSPECTION OF BLADDER, VIA NATURAL OR ARTIFICIAL OPENING ENDOSCOPIC: ICD-10-PCS | Performed by: INTERNAL MEDICINE

## 2022-07-21 PROCEDURE — 2580000003 HC RX 258: Performed by: FAMILY MEDICINE

## 2022-07-21 PROCEDURE — 80048 BASIC METABOLIC PNL TOTAL CA: CPT

## 2022-07-21 PROCEDURE — 7100000001 HC PACU RECOVERY - ADDTL 15 MIN: Performed by: UROLOGY

## 2022-07-21 PROCEDURE — 36415 COLL VENOUS BLD VENIPUNCTURE: CPT

## 2022-07-21 PROCEDURE — 85610 PROTHROMBIN TIME: CPT

## 2022-07-21 PROCEDURE — 7100000000 HC PACU RECOVERY - FIRST 15 MIN: Performed by: UROLOGY

## 2022-07-21 PROCEDURE — 3700000001 HC ADD 15 MINUTES (ANESTHESIA): Performed by: UROLOGY

## 2022-07-21 PROCEDURE — 3600000003 HC SURGERY LEVEL 3 BASE: Performed by: UROLOGY

## 2022-07-21 PROCEDURE — 74420 UROGRAPHY RTRGR +-KUB: CPT

## 2022-07-21 PROCEDURE — 6360000002 HC RX W HCPCS: Performed by: NURSE ANESTHETIST, CERTIFIED REGISTERED

## 2022-07-21 PROCEDURE — 3600000013 HC SURGERY LEVEL 3 ADDTL 15MIN: Performed by: UROLOGY

## 2022-07-21 PROCEDURE — 3700000000 HC ANESTHESIA ATTENDED CARE: Performed by: UROLOGY

## 2022-07-21 PROCEDURE — 71045 X-RAY EXAM CHEST 1 VIEW: CPT

## 2022-07-21 PROCEDURE — C1769 GUIDE WIRE: HCPCS | Performed by: UROLOGY

## 2022-07-21 PROCEDURE — 2709999900 HC NON-CHARGEABLE SUPPLY: Performed by: UROLOGY

## 2022-07-21 PROCEDURE — 1200000000 HC SEMI PRIVATE

## 2022-07-21 PROCEDURE — C2617 STENT, NON-COR, TEM W/O DEL: HCPCS | Performed by: UROLOGY

## 2022-07-21 PROCEDURE — 3209999900 FLUORO FOR SURGICAL PROCEDURES

## 2022-07-21 PROCEDURE — BT1D1ZZ FLUOROSCOPY OF RIGHT KIDNEY, URETER AND BLADDER USING LOW OSMOLAR CONTRAST: ICD-10-PCS | Performed by: INTERNAL MEDICINE

## 2022-07-21 PROCEDURE — 85027 COMPLETE CBC AUTOMATED: CPT

## 2022-07-21 PROCEDURE — C1758 CATHETER, URETERAL: HCPCS | Performed by: UROLOGY

## 2022-07-21 PROCEDURE — 0T768DZ DILATION OF RIGHT URETER WITH INTRALUMINAL DEVICE, VIA NATURAL OR ARTIFICIAL OPENING ENDOSCOPIC: ICD-10-PCS | Performed by: INTERNAL MEDICINE

## 2022-07-21 DEVICE — URETERAL STENT
Type: IMPLANTABLE DEVICE | Site: URETER | Status: FUNCTIONAL
Brand: PERCUFLEX™

## 2022-07-21 RX ORDER — FENTANYL CITRATE 50 UG/ML
INJECTION, SOLUTION INTRAMUSCULAR; INTRAVENOUS PRN
Status: DISCONTINUED | OUTPATIENT
Start: 2022-07-21 | End: 2022-07-21 | Stop reason: SDUPTHER

## 2022-07-21 RX ORDER — SODIUM CHLORIDE 9 MG/ML
INJECTION, SOLUTION INTRAVENOUS CONTINUOUS PRN
Status: DISCONTINUED | OUTPATIENT
Start: 2022-07-21 | End: 2022-07-21 | Stop reason: SDUPTHER

## 2022-07-21 RX ORDER — SODIUM CHLORIDE, SODIUM LACTATE, POTASSIUM CHLORIDE, CALCIUM CHLORIDE 600; 310; 30; 20 MG/100ML; MG/100ML; MG/100ML; MG/100ML
INJECTION, SOLUTION INTRAVENOUS CONTINUOUS
Status: DISCONTINUED | OUTPATIENT
Start: 2022-07-21 | End: 2022-07-22

## 2022-07-21 RX ORDER — CEFAZOLIN SODIUM 1 G/3ML
INJECTION, POWDER, FOR SOLUTION INTRAMUSCULAR; INTRAVENOUS PRN
Status: DISCONTINUED | OUTPATIENT
Start: 2022-07-21 | End: 2022-07-21 | Stop reason: SDUPTHER

## 2022-07-21 RX ORDER — PROPOFOL 10 MG/ML
INJECTION, EMULSION INTRAVENOUS CONTINUOUS PRN
Status: DISCONTINUED | OUTPATIENT
Start: 2022-07-21 | End: 2022-07-21 | Stop reason: SDUPTHER

## 2022-07-21 RX ORDER — FENTANYL CITRATE 50 UG/ML
INJECTION, SOLUTION INTRAMUSCULAR; INTRAVENOUS
Status: COMPLETED
Start: 2022-07-21 | End: 2022-07-21

## 2022-07-21 RX ADMIN — CEFAZOLIN SODIUM 1000 MG: 1 INJECTION, POWDER, FOR SOLUTION INTRAMUSCULAR; INTRAVENOUS at 14:38

## 2022-07-21 RX ADMIN — FENTANYL CITRATE 50 MCG: 50 INJECTION, SOLUTION INTRAMUSCULAR; INTRAVENOUS at 15:18

## 2022-07-21 RX ADMIN — ACETAMINOPHEN 650 MG: 325 TABLET ORAL at 20:16

## 2022-07-21 RX ADMIN — FENTANYL CITRATE 50 MCG: 50 INJECTION, SOLUTION INTRAMUSCULAR; INTRAVENOUS at 15:12

## 2022-07-21 RX ADMIN — SODIUM CHLORIDE, POTASSIUM CHLORIDE, SODIUM LACTATE AND CALCIUM CHLORIDE: 600; 310; 30; 20 INJECTION, SOLUTION INTRAVENOUS at 16:44

## 2022-07-21 RX ADMIN — CARVEDILOL 12.5 MG: 6.25 TABLET, FILM COATED ORAL at 17:07

## 2022-07-21 RX ADMIN — SODIUM CHLORIDE: 9 INJECTION, SOLUTION INTRAVENOUS at 14:32

## 2022-07-21 RX ADMIN — PROPOFOL 50 MCG/KG/MIN: 10 INJECTION, EMULSION INTRAVENOUS at 14:38

## 2022-07-21 RX ADMIN — DIGOXIN 125 MCG: 125 TABLET ORAL at 09:32

## 2022-07-21 RX ADMIN — CARVEDILOL 12.5 MG: 6.25 TABLET, FILM COATED ORAL at 09:32

## 2022-07-21 RX ADMIN — Medication 10 ML: at 09:32

## 2022-07-21 RX ADMIN — OLANZAPINE 2.5 MG: 2.5 TABLET, FILM COATED ORAL at 20:16

## 2022-07-21 RX ADMIN — ROSUVASTATIN CALCIUM 10 MG: 10 TABLET, FILM COATED ORAL at 17:07

## 2022-07-21 ASSESSMENT — PAIN DESCRIPTION - ORIENTATION: ORIENTATION: RIGHT;LEFT

## 2022-07-21 ASSESSMENT — PAIN SCALES - WONG BAKER
WONGBAKER_NUMERICALRESPONSE: 6
WONGBAKER_NUMERICALRESPONSE: 2
WONGBAKER_NUMERICALRESPONSE: 0

## 2022-07-21 ASSESSMENT — PAIN DESCRIPTION - LOCATION: LOCATION: ABDOMEN;COCCYX

## 2022-07-21 ASSESSMENT — PAIN DESCRIPTION - FREQUENCY: FREQUENCY: INTERMITTENT

## 2022-07-21 ASSESSMENT — PAIN SCALES - GENERAL
PAINLEVEL_OUTOF10: 6
PAINLEVEL_OUTOF10: 0

## 2022-07-21 ASSESSMENT — PAIN DESCRIPTION - DESCRIPTORS: DESCRIPTORS: ACHING;DISCOMFORT;DULL

## 2022-07-21 ASSESSMENT — PAIN DESCRIPTION - PAIN TYPE: TYPE: ACUTE PAIN

## 2022-07-21 NOTE — ANESTHESIA PRE PROCEDURE
Department of Anesthesiology  Preprocedure Note       Name:  Tammy Valderrama   Age:  82 y.o.  :  1935                                          MRN:  [id number]         Date:  2022      Surgeon: Giorgio Sapp):  MD Puja Leavitt MD    Procedure: Procedure(s):  CYSTOSCOPY RETROGRADE PYELOGRAM RIGHT STENT INSERTION (WANTS TF)    Medications prior to admission:   Prior to Admission medications    Medication Sig Start Date End Date Taking? Authorizing Provider   carvedilol (COREG) 12.5 MG tablet Take 12.5 mg by mouth in the morning and 12.5 mg in the evening. Take with meals. Yes Historical Provider, MD   digoxin (LANOXIN) 125 MCG tablet Take 125 mcg by mouth in the morning. Yes Historical Provider, MD   lisinopril (PRINIVIL;ZESTRIL) 10 MG tablet Take 10 mg by mouth in the morning. Yes Historical Provider, MD   OLANZapine (ZYPREXA) 2.5 MG tablet Take 2.5 mg by mouth nightly   Yes Historical Provider, MD   rosuvastatin (CRESTOR) 10 MG tablet Take 10 mg by mouth every evening   Yes Historical Provider, MD   acetaminophen (TYLENOL) 325 MG tablet Take 650 mg by mouth every 6 hours as needed for Pain   Yes Historical Provider, MD   benzocaine-menthol (CEPACOL SORE THROAT) 15-3.6 MG lozenge Take 1 lozenge by mouth every 2 hours as needed for Sore Throat (cough)   Yes Historical Provider, MD   haloperidol lactate (HALDOL) 5 MG/ML injection Inject 2 mg into the muscle every 8 hours as needed for Agitation   Yes Historical Provider, MD   haloperidol (HALDOL) 2 MG tablet Take 2 mg by mouth every 8 hours as needed (agitation)   Yes Historical Provider, MD   LORazepam (ATIVAN) 1 MG tablet Take 1 mg by mouth every 6 hours as needed for Anxiety (agitation).    Yes Historical Provider, MD       Current medications:    Current Facility-Administered Medications   Medication Dose Route Frequency Provider Last Rate Last Admin    dextrose 5 % and 0.9 % sodium chloride infusion   IntraVENous Continuous Adilene Fingerville Mauryrobert Tran, APRN - CNP 75 mL/hr at 07/20/22 1534 New Bag at 07/20/22 1534    sodium chloride flush 0.9 % injection 10 mL  10 mL IntraVENous 2 times per day Garnette Coats, DO   10 mL at 07/21/22 0932    sodium chloride flush 0.9 % injection 10 mL  10 mL IntraVENous PRN Garnette Coats, DO        0.9 % sodium chloride infusion   IntraVENous PRN Garnette Coats, DO        promethazine (PHENERGAN) tablet 12.5 mg  12.5 mg Oral Q6H PRN Garnette Coats, DO        Or    ondansetron [facility]) injection 4 mg  4 mg IntraVENous Q6H PRN Garnette Coats, DO        polyethylene glycol (GLYCOLAX) packet 17 g  17 g Oral Daily PRN Garnette Coats, DO        acetaminophen (TYLENOL) tablet 650 mg  650 mg Oral Q6H PRN Garnette Coats, DO   650 mg at 07/19/22 0809    Or    acetaminophen (TYLENOL) suppository 650 mg  650 mg Rectal Q6H PRN Garnette Coats, DO        carvedilol (COREG) tablet 12.5 mg  12.5 mg Oral BID WC Merissa العلي MD   12.5 mg at 07/21/22 0932    digoxin (LANOXIN) tablet 125 mcg  125 mcg Oral Daily Merissa العلي MD   125 mcg at 07/21/22 0932    haloperidol (HALDOL) tablet 2 mg  2 mg Oral Q8H PRN Merissa العلي MD   2 mg at 07/18/22 0940    haloperidol lactate (HALDOL) injection 2 mg  2 mg IntraMUSCular Q8H PRN Merissa العلي MD   2 mg at 07/19/22 1623    [Held by provider] lisinopril (PRINIVIL;ZESTRIL) tablet 10 mg  10 mg Oral Daily Merissa العلي MD   10 mg at 07/18/22 1057    LORazepam (ATIVAN) tablet 1 mg  1 mg Oral Q6H PRN Merissa العلي MD   1 mg at 07/19/22 1422    OLANZapine (ZYPREXA) tablet 2.5 mg  2.5 mg Oral Nightly Merissa العلي MD   2.5 mg at 07/20/22 2035    rosuvastatin (CRESTOR) tablet 10 mg  10 mg Oral QPM Merissa العلي MD   10 mg at 07/20/22 1814     Facility-Administered Medications Ordered in Other Encounters   Medication Dose Route Frequency Provider Last Rate Last Admin    ceFAZolin (ANCEF) injection   IntraVENous PRN José Miguel Osorio, APRN - CRNA   1,000 mg at 07/21/22 1438       Allergies:  No Known Allergies    Problem List:    Patient Active Problem List   Diagnosis Code    [facility]) ([facility] N17.9       Past Medical History:  History reviewed. No pertinent past medical history. Past Surgical History:  No past surgical history on file. Social History:    Social History     Tobacco Use    Smoking status: Not on file    Smokeless tobacco: Not on file   Substance Use Topics    Alcohol use: Not on file                                Counseling given: Not Answered      Vital Signs (Current):   Vitals:    07/20/22 1600 07/20/22 2033 07/20/22 2332 07/21/22 0800   BP: 110/68 (!) 123/100 136/64 104/80   Pulse: 95 57 80 72   Resp: 18 18 20 18   Temp: 36.7 °C (98.1 °F) 36.3 °C (97.3 °F) 36.4 °C (97.5 °F) 36.2 °C (97.1 °F)   TempSrc: Temporal Temporal Temporal Temporal   SpO2: 93% 94% 95% 95%   Weight:       Height:                                                  BP Readings from Last 3 Encounters:   07/21/22 104/80       NPO Status: Time of last liquid consumption: 0932 (SIPS WITH MEDS)                        Time of last solid consumption: 2200                        Date of last liquid consumption: 07/21/22                        Date of last solid food consumption: 07/20/22    BMI:   Wt Readings from Last 3 Encounters:   07/18/22 169 lb (76.7 kg)     Body mass index is 28.12 kg/m².     CBC:   Lab Results   Component Value Date/Time    WBC 6.4 07/21/2022 06:20 AM    RBC 4.41 07/21/2022 06:20 AM    HGB 12.7 07/21/2022 06:20 AM    HCT 42.3 07/21/2022 06:20 AM    MCV 95.9 07/21/2022 06:20 AM    RDW 13.7 07/21/2022 06:20 AM     07/21/2022 06:20 AM       CMP:   Lab Results   Component Value Date/Time     07/21/2022 06:20 AM    K 4.2 07/21/2022 06:20 AM    K 5.0 07/18/2022 06:44 AM     07/21/2022 06:20 AM    CO2 26 07/21/2022 06:20 AM    BUN 26 07/21/2022 06:20 AM    CREATININE 1.1 07/21/2022 06:20 AM    GFRAA 57 07/21/2022 06:20 AM LABGLOM 47 07/21/2022 06:20 AM    GLUCOSE 124 07/21/2022 06:20 AM    PROT 6.9 07/18/2022 06:44 AM    CALCIUM 8.6 07/21/2022 06:20 AM    BILITOT 0.5 07/18/2022 06:44 AM    ALKPHOS 113 07/18/2022 06:44 AM    AST 21 07/18/2022 06:44 AM    ALT 13 07/18/2022 06:44 AM     EKG 7/17/22  Atrial Fibrillation    POC Tests: No results for input(s): POCGLU, POCNA, POCK, POCCL, POCBUN, POCHEMO, POCHCT in the last 72 hours. Coags:   Lab Results   Component Value Date/Time    PROTIME 14.8 07/21/2022 06:20 AM    INR 1.4 07/21/2022 06:20 AM    APTT 43.6 07/17/2022 05:17 AM       HCG (If Applicable): No results found for: PREGTESTUR, PREGSERUM, HCG, HCGQUANT     ABGs: No results found for: PHART, PO2ART, SYN9QQA, ESK6GZC, BEART, J4BREXTZ     Type & Screen (If Applicable):  No results found for: LABABO, LABRH    Drug/Infectious Status (If Applicable):  No results found for: HIV, HEPCAB    COVID-19 Screening (If Applicable): No results found for: COVID19        Anesthesia Evaluation    Airway: Mallampati: Unable to assess / NA          Dental:      Comment: Unable to assess,     Pulmonary:Negative Pulmonary ROS   (+) decreased breath sounds                            Cardiovascular:    (+) dysrhythmias: atrial fibrillation,         Rhythm: irregular  Rate: abnormal                    Neuro/Psych:   Negative Neuro/Psych ROS              GI/Hepatic/Renal:            ROS comment: Acute renal failure . Endo/Other: Negative Endo/Other ROS                    Abdominal:             Vascular: negative vascular ROS. Other Findings:           Anesthesia Plan      MAC     ASA 3       Induction: intravenous. Plan discussed with attending.                     JESSICA Patel - CECILLE   7/21/2022

## 2022-07-21 NOTE — PROGRESS NOTES
Hospitalist Progress Note      SYNOPSIS: Patient admitted on 2022 for potential altered mental status from nursing facility. 70-year-old female with past medical history atrial fibrillation on digoxin and Coreg but not on anticoagulation, hypertension, hyperlipidemia bipolar/schizophrenia [facility] presents due to hypotension and altered mental status from a nursing facility. Nephrology has been consulted, patient was started on bicarb drip. Renal function improved. Ultrasound of the kidney showed moderate to severe right hydronephrosis and urology was consulted. CT abdomen which showed dilation of right renal pelvis concerning for congenital versus acquired UPJ obstruction. Urology following. SUBJECTIVE:    Patient seen and examined in her room. Alert awake oriented x2. Denies any chest pain, nausea, vomiting. Records reviewed. Stable overnight. No other overnight issues reported. Temp (24hrs), Av.6 °F (36.4 °C), Min:97.3 °F (36.3 °C), Max:98.1 °F (36.7 °C)    DIET: Diet NPO  CODE: Full Code    Intake/Output Summary (Last 24 hours) at 2022 0913  Last data filed at 2022 0550  Gross per 24 hour   Intake 270 ml   Output 1300 ml   Net -1030 ml       OBJECTIVE:    /64   Pulse 80   Temp 97.5 °F (36.4 °C) (Temporal)   Resp 20   Ht 5' 5\" (1.651 m)   Wt 169 lb (76.7 kg)   SpO2 95%   BMI 28.12 kg/m²     General appearance: No apparent distress, appears stated age and cooperative. HEENT:  Conjunctivae/corneas clear. Neck: Supple. No jugular venous distention. Respiratory: Clear to auscultation bilaterally, normal respiratory effort  Cardiovascular: Regular rate rhythm, normal S1-S2  Abdomen: Soft, nontender, nondistended  Musculoskeletal: No clubbing, cyanosis, no bilateral lower extremity edema. Brisk capillary refill.    Skin:  No rashes  on visible skin  Neurologic: awake, alert and following commands Assessment/Plan:  -Acute renal failure  -Altered mental status  -Metabolic acidosis  -Hypotension  -Atrial fibrillation not on anticoagulation  -Dyslipidemia  -Right hydronephrosis        Plan  1. Continue IV fluids, creatinine improving, follow-up with nephrology, continue to monitor labs, hold nephrotoxic medications  2. Metabolic acidosis improved, currently off bicarb drip  3. Blood pressure stable, continue to monitor  4. Heart rate controlled, INR was supratherapeutic but Coumadin was not on her med rec, INR trending down  5.  CT abdomen reviewed, follow-up with urology, final evaluation pending       DISPOSITION:     Medications:  REVIEWED DAILY    Infusion Medications    dextrose 5 % and 0.9 % NaCl 75 mL/hr at 07/20/22 1534    sodium chloride       Scheduled Medications    sodium chloride flush  10 mL IntraVENous 2 times per day    carvedilol  12.5 mg Oral BID WC    digoxin  125 mcg Oral Daily    [Held by provider] lisinopril  10 mg Oral Daily    OLANZapine  2.5 mg Oral Nightly    rosuvastatin  10 mg Oral QPM     PRN Meds: sodium chloride flush, sodium chloride, promethazine **OR** ondansetron, polyethylene glycol, acetaminophen **OR** acetaminophen, haloperidol, haloperidol lactate, LORazepam    Labs:     Recent Labs     07/19/22  0447 07/21/22  0620   WBC 7.3 6.4   HGB 12.0 12.7   HCT 36.8 42.3    264       Recent Labs     07/19/22  1635 07/20/22  0502 07/21/22  0620    142 143   K 4.7 4.4 4.2    105 109*   CO2 23 27 26   BUN 43* 33* 26*   CREATININE 1.4* 1.3* 1.1*   CALCIUM 8.7 8.9 8.6       No results for input(s): PROT, ALB, ALKPHOS, ALT, AST, BILITOT, AMYLASE, LIPASE in the last 72 hours. Recent Labs     07/19/22  0447 07/21/22  0620   INR 2.2 1.4       No results for input(s): CKTOTAL, TROPONINI in the last 72 hours.     Chronic labs:    Lab Results   Component Value Date    INR 1.4 07/21/2022       Radiology: REVIEWED DAILY    +++++++++++++++++++++++++++++++++++++++++++++++++  Marcelo James MD  [address]  +++++++++++++++++++++++++++++++++++++++++++++++++  NOTE: This report was transcribed using voice recognition software. Every effort was made to ensure accuracy; however, inadvertent computerized transcription errors may be present.

## 2022-07-21 NOTE — CARE COORDINATION
7/21/2022 social work transition of care planning  Pt plan is to return to [facility],once medically stable. Will need neg covid day of discharge.   Electronically signed by GREG Schulte on 7/21/2022 at 11:45 AM

## 2022-07-21 NOTE — PROGRESS NOTES
Department of Internal Medicine  Nephrology Progress Note    Events reviewed. For cystoscopy, retrograde pyelogram and possible right stent placement. SUBJECTIVE:  We are following this patient for SHLOMO and HAGMA. The patient is resting comfortably and has no complaints.      Physical Exam:    VITALS:  /80   Pulse 72   Temp 97.1 °F (36.2 °C) (Temporal)   Resp 18   Ht 5' 5\" (1.651 m)   Wt 169 lb (76.7 kg)   SpO2 95%   BMI 28.12 kg/m²   24HR INTAKE/OUTPUT:    Intake/Output Summary (Last 24 hours) at 7/21/2022 1340  Last data filed at 7/21/2022 0550  Gross per 24 hour   Intake 270 ml   Output 1300 ml   Net -1030 ml         Constitutional: Awake, alert, NAD  HEENT: PERRL, normocephalic, atraumatic  Respiratory: CTA B, normal respirations  Cardiovascular/Edema: RRR, S1/S2, no edema noted on exam  Gastrointestinal: Abdomen soft, nontender  Neurologic: Confused, baseline  Skin: Warm, dry, no rash or lesion    Scheduled Meds:   sodium chloride flush  10 mL IntraVENous 2 times per day    carvedilol  12.5 mg Oral BID WC    digoxin  125 mcg Oral Daily    [Held by provider] lisinopril  10 mg Oral Daily    OLANZapine  2.5 mg Oral Nightly    rosuvastatin  10 mg Oral QPM     Continuous Infusions:   dextrose 5 % and 0.9 % NaCl 75 mL/hr at 07/20/22 1534    sodium chloride       PRN Meds:.sodium chloride flush, sodium chloride, promethazine **OR** ondansetron, polyethylene glycol, acetaminophen **OR** acetaminophen, haloperidol, haloperidol lactate, LORazepam    DATA:    CBC with Differential:    Lab Results   Component Value Date/Time    WBC 6.4 07/21/2022 06:20 AM    RBC 4.41 07/21/2022 06:20 AM    HGB 12.7 07/21/2022 06:20 AM    HCT 42.3 07/21/2022 06:20 AM     07/21/2022 06:20 AM    MCV 95.9 07/21/2022 06:20 AM    MCH 28.8 07/21/2022 06:20 AM    MCHC 30.0 07/21/2022 06:20 AM    RDW 13.7 07/21/2022 06:20 AM    LYMPHOPCT 17.0 07/18/2022 06:44 AM    MONOPCT 7.4 07/18/2022 06:44 AM    BASOPCT 0.9 07/18/2022 06:44 AM    MONOSABS 0.58 07/18/2022 06:44 AM    LYMPHSABS 1.34 07/18/2022 06:44 AM    EOSABS 0.26 07/18/2022 06:44 AM    BASOSABS 0.07 07/18/2022 06:44 AM     CMP:    Lab Results   Component Value Date/Time     07/21/2022 06:20 AM    K 4.2 07/21/2022 06:20 AM    K 5.0 07/18/2022 06:44 AM     07/21/2022 06:20 AM    CO2 26 07/21/2022 06:20 AM    BUN 26 07/21/2022 06:20 AM    CREATININE 1.1 07/21/2022 06:20 AM    GFRAA 57 07/21/2022 06:20 AM    LABGLOM 47 07/21/2022 06:20 AM    GLUCOSE 124 07/21/2022 06:20 AM    PROT 6.9 07/18/2022 06:44 AM    LABALBU 2.8 07/18/2022 06:44 AM    CALCIUM 8.6 07/21/2022 06:20 AM    BILITOT 0.5 07/18/2022 06:44 AM    ALKPHOS 113 07/18/2022 06:44 AM    AST 21 07/18/2022 06:44 AM    ALT 13 07/18/2022 06:44 AM     Magnesium:  No results found for: MG  Phosphorus:  No results found for: PHOS  Radiology Review:      Renal Flow scan July 20, 2022   Findings are compatible with obstruction on the right and likely there   is a component of bilateral renal failure               CT AP WO Contrast July 20, 2022   1. Dilatation of the right renal pelvis. 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 If there is a history of   hematuria, retrograde urogram recommended. 2.  Prior cholecystectomy, internal endo biliary stent located within the   common bile duct extending into the duodenum without evidence of complication. 3.  Small centrilobular densities in the right and left lower lobe which may   be related to infection/inflammation       4. Jensen catheter in the urinary bladder. Renal US  July 19, 2022   there is moderate to severe hydronephrosis of the right kidney or multiple   parapelvic cysts. Nonobstructing stones seen in the left kidney. The bladder is incompletely   distended. BRIEF SUMMARY OF INITIAL CONSULT:       Briefly, Ms.  Genevie Fontan, is an 72-year-old female with a past medical history of today      Electronically signed by JESSICA Richards CNP on 7/21/2022 at 1:40 PM

## 2022-07-21 NOTE — PROGRESS NOTES
RN called son Pillo Veliz to consent for Urology procedure today. Savannah has questions for the MD before consent is obtained. RN placed call out to Urology answering services.

## 2022-07-21 NOTE — OP NOTE
[address] [facility],  [facility]                                OPERATIVE REPORT    PATIENT NAME: Lewis Ellis                        :        1935  MED REC NO:   [id number]                            ROOM:  ACCOUNT NO:   [de-identified]                           ADMIT DATE: 2022  PROVIDER:     Deuce Garcia MD    DATE OF PROCEDURE:  2022    PREOPERATIVE DIAGNOSIS:  Right renal obstruction with azotemia. POSTOPERATIVE DIAGNOSIS:  Right ureteropelvic junction obstruction with  hydronephrosis. OPERATIONS:  Cystopanendoscopy, retrograde pyelogram, and stent  placement. SURGEON:  Deuce Garcia MD    ANESTHESIA:  LMAC. ESTIMATED BLOOD LOSS:  Less than 50. OPERATION REPORT:  With the patient in the lithotomy position under  satisfactory sedation, the genitalia were prepped and draped in a  sterile manner. A 21-Surinamese panendoscope passed easily through the  pendulous and membranous urethra. The urine was obtained for culture  and sensitivity. Inspection revealed a very badly trabeculated bladder  with small diverticulum throughout the bladder. The trigone was  symmetrical.  The ureteral orifices were of normal configuration and  location. Following inspection, which did not reveal any mucosal  lesions of concern, an open-ended catheter was passed into the left  ureteral orifice. Contrast was instilled. The collecting system  appeared to be unremarkable. On the right, the ureter was normal;  however, there appeared to be an obstructed renal pelvis with  hydronephrotic calyceal changes. A wire was inserted up the ureter and  went into the renal pelvis. Following which, a 24 cm 6-Surinamese double-J  stent was passed, one end curled in the renal pelvis and the other in  the bladder.   The bladder was drained with a 16-Surinamese Jensen and the  patient was sent to recovery room in satisfactory condition.         Cyndi Fam MD    D: 07/21/2022 15:11:16       T: 07/21/2022 15:14:39     [email or web address]  Job#: [id number]     Doc#: [id number]    CC:

## 2022-07-21 NOTE — ANESTHESIA POSTPROCEDURE EVALUATION
Department of Anesthesiology  Postprocedure Note    Patient: Jeffrey Lama  MRN: [id number]  YOB: 1935  Date of evaluation: 7/21/2022      Procedure Summary     Date: 07/21/22 Room / Location: [facility] OR 11 / [facility]    Anesthesia Start: 0 Anesthesia Stop: 1519    Procedure: CYSTOSCOPY RETROGRADE PYELOGRAM RIGHT STENT INSERTION, VENTURA CHANGE (Right: Bladder) Diagnosis:       Hydronephrosis of right kidney      (Hydronephrosis of right kidney [N13.30])    Surgeons: Lashay Dickinson MD Responsible Provider: Sebastien Forbes MD    Anesthesia Type: MAC ASA Status: 3          Anesthesia Type: No value filed.     Poonam Phase I: Poonam Score: 8    Poonam Phase II:        Anesthesia Post Evaluation    Patient location during evaluation: PACU  Patient participation: complete - patient participated  Level of consciousness: awake and alert  Airway patency: patent  Nausea & Vomiting: no nausea and no vomiting  Complications: no  Cardiovascular status: blood pressure returned to baseline and hemodynamically stable  Respiratory status: acceptable and spontaneous ventilation  Hydration status: euvolemic  Multimodal analgesia pain management approach

## 2022-07-21 NOTE — PROGRESS NOTES
2022 11:04 AM  Service: Urology  Group: [facility] (Kip/Noemy/Faraz)    Terrell Pal  [id number]    Subjective:   Sleeping  Confused  Telesitter at bedside  Velazquez with yellow urine     Review of Systems  Unable to obtain due to confusion       Scheduled Meds:   sodium chloride flush  10 mL IntraVENous 2 times per day    carvedilol  12.5 mg Oral BID WC    digoxin  125 mcg Oral Daily    [Held by provider] lisinopril  10 mg Oral Daily    OLANZapine  2.5 mg Oral Nightly    rosuvastatin  10 mg Oral QPM       Objective:  Vitals:    22 0800   BP: 104/80   Pulse: 72   Resp: 18   Temp: 97.1 °F (36.2 °C)   SpO2: 95%         Allergies: Patient has no known allergies. General Appearance: sleeping, arouses to verbal stimuli, lethargic, confused   Skin: no rash or erythema  Head: normocephalic and atraumatic  Pulmonary/Chest: normal air movement, no respiratory distress  Abdomen: soft, non-tender, non-distended  Genitourinary: velazquez with miles urine   Extremities: no cyanosis, clubbing or edema         Labs:     Recent Labs     22  0620      K 4.2   *   CO2 26   BUN 26*   CREATININE 1.1*   GLUCOSE 124*   CALCIUM 8.6       Lab Results   Component Value Date/Time    HGB 12.7 2022 06:20 AM    HCT 42.3 2022 06:20 AM       No results found for: PSA    Narrative   Patient MRN: [id number]   : 1935   Age:  82 years   Gender: Female   Order Date: 2022 1:19 PM   Exam: NM KIDNEY W FLOW AND FUNCTION W PHARMACOLOGICAL INTERVENTION   Number of Images: 2 views   Indication:   Right hydronephrosis? With IV Lasix   Right hydronephrosis? What reading provider will be dictating this exam?->[facility]   Comparison: None.        Findings:       The patient was injected with 8 mCi of technetium MAG3 intravenously       The patient was injected with 10 mg grams of Lasix IV at 30 minutes       Split uptake on the left was 79  percent, time to peak was 14.5   minutes, one half clearance time was 43 minutes       Split uptake on the right was 21 percent, time to peak was 45.5   minutes, one half clearance time was 76 minutes       Renal curves demonstrate perfusion to be severely delayed on the   right. . There is more mild delay on the left   Extraction was severely delayed on the right. .There is more mild delay   on the left       Excretion was severely delayed on the right. .There is more mild delay   on the left       There is no improvement with Lasix administration           Impression   Findings are compatible with obstruction on the right and likely there   is a component of bilateral renal failure             Assessment/Plan:   Right hydronephrosis  Right UPJ obstruction    Creatinine stable now  Nephrology following   MAG3 suggest of right renal obstruction, CT suggest right UPJ obstruction  Keep NPO  Will take to OR for cystoscopy, retrograde pyelogram, possible right stent placement   Discussed with son Calderon Garces on the phone who consented to the above  Will follow       JESSICA Miller - CNP   [facility]  Urology  Agree with above assessment and plan

## 2022-07-22 LAB
ANION GAP SERPL CALCULATED.3IONS-SCNC: 9 MMOL/L (ref 7–16)
BLOOD CULTURE, ROUTINE: NORMAL
BUN BLDV-MCNC: 22 MG/DL (ref 6–23)
CALCIUM SERPL-MCNC: 8.3 MG/DL (ref 8.6–10.2)
CHLORIDE BLD-SCNC: 105 MMOL/L (ref 98–107)
CO2: 25 MMOL/L (ref 22–29)
CREAT SERPL-MCNC: 1 MG/DL (ref 0.5–1)
GFR AFRICAN AMERICAN: >60
GFR NON-AFRICAN AMERICAN: 53 ML/MIN/1.73
GLUCOSE BLD-MCNC: 102 MG/DL (ref 74–99)
HCT VFR BLD CALC: 34.8 % (ref 34–48)
HEMOGLOBIN: 10.8 G/DL (ref 11.5–15.5)
MCH RBC QN AUTO: 28.5 PG (ref 26–35)
MCHC RBC AUTO-ENTMCNC: 31 % (ref 32–34.5)
MCV RBC AUTO: 91.8 FL (ref 80–99.9)
PDW BLD-RTO: 13.7 FL (ref 11.5–15)
PLATELET # BLD: 248 E9/L (ref 130–450)
PMV BLD AUTO: 9.2 FL (ref 7–12)
POTASSIUM SERPL-SCNC: 4 MMOL/L (ref 3.5–5)
RBC # BLD: 3.79 E12/L (ref 3.5–5.5)
SODIUM BLD-SCNC: 139 MMOL/L (ref 132–146)
WBC # BLD: 7.7 E9/L (ref 4.5–11.5)

## 2022-07-22 PROCEDURE — 6370000000 HC RX 637 (ALT 250 FOR IP): Performed by: FAMILY MEDICINE

## 2022-07-22 PROCEDURE — 2580000003 HC RX 258: Performed by: FAMILY MEDICINE

## 2022-07-22 PROCEDURE — 36415 COLL VENOUS BLD VENIPUNCTURE: CPT

## 2022-07-22 PROCEDURE — 6360000002 HC RX W HCPCS: Performed by: INTERNAL MEDICINE

## 2022-07-22 PROCEDURE — 2580000003 HC RX 258: Performed by: INTERNAL MEDICINE

## 2022-07-22 PROCEDURE — 1200000000 HC SEMI PRIVATE

## 2022-07-22 PROCEDURE — 2580000003 HC RX 258: Performed by: UROLOGY

## 2022-07-22 PROCEDURE — 80048 BASIC METABOLIC PNL TOTAL CA: CPT

## 2022-07-22 PROCEDURE — 85027 COMPLETE CBC AUTOMATED: CPT

## 2022-07-22 RX ORDER — AMLODIPINE BESYLATE 5 MG/1
5 TABLET ORAL DAILY
Status: DISCONTINUED | OUTPATIENT
Start: 2022-07-23 | End: 2022-07-23 | Stop reason: HOSPADM

## 2022-07-22 RX ADMIN — Medication 10 ML: at 21:19

## 2022-07-22 RX ADMIN — SODIUM CHLORIDE, POTASSIUM CHLORIDE, SODIUM LACTATE AND CALCIUM CHLORIDE: 600; 310; 30; 20 INJECTION, SOLUTION INTRAVENOUS at 07:21

## 2022-07-22 RX ADMIN — SODIUM CHLORIDE, POTASSIUM CHLORIDE, SODIUM LACTATE AND CALCIUM CHLORIDE: 600; 310; 30; 20 INJECTION, SOLUTION INTRAVENOUS at 00:24

## 2022-07-22 RX ADMIN — CEFTRIAXONE 1000 MG: 1 INJECTION, POWDER, FOR SOLUTION INTRAMUSCULAR; INTRAVENOUS at 11:02

## 2022-07-22 RX ADMIN — Medication 10 ML: at 08:56

## 2022-07-22 RX ADMIN — CARVEDILOL 12.5 MG: 6.25 TABLET, FILM COATED ORAL at 17:18

## 2022-07-22 RX ADMIN — LISINOPRIL 10 MG: 10 TABLET ORAL at 08:56

## 2022-07-22 RX ADMIN — ROSUVASTATIN CALCIUM 10 MG: 10 TABLET, FILM COATED ORAL at 17:16

## 2022-07-22 RX ADMIN — DIGOXIN 125 MCG: 125 TABLET ORAL at 08:56

## 2022-07-22 RX ADMIN — OLANZAPINE 2.5 MG: 2.5 TABLET, FILM COATED ORAL at 21:19

## 2022-07-22 RX ADMIN — CARVEDILOL 12.5 MG: 6.25 TABLET, FILM COATED ORAL at 08:56

## 2022-07-22 RX ADMIN — LORAZEPAM 1 MG: 1 TABLET ORAL at 21:19

## 2022-07-22 ASSESSMENT — PAIN SCALES - WONG BAKER: WONGBAKER_NUMERICALRESPONSE: 0;10

## 2022-07-22 NOTE — CARE COORDINATION
7/22/2022 social work transition of care planning  Pt plan is to Return to [facility],once medically stable. Electronically signed by GREG Benitez on 7/22/2022 at 9:47 AM    Addendum; Reynaldo spoke with Aimee at [facility],pt can admit once medically stable. N/N [phone] Fax [phone]. Will need neg covid test closer to discharge.   Electronically signed by GREG Benitez on 7/22/2022 at 10:11 AM

## 2022-07-22 NOTE — DISCHARGE INSTRUCTIONS
Call upon discharge to schedule a follow-up visit with Uday Shin/Noemy/Faraz ([facility]) at [phone]       A ureteral stent was inserted during your recent procedure. Unlike a heart \"stent\" which is metal, short, and permanent, this ureteral stent plastic, and temporary. It spans from your kidney, down the ureter, and into your bladder. This will need to be removed, so it is very important that you follow-up with your doctor. IMPORTANT - This ureteral stent will likely cause you to experience frequent urination, urgency to urinate, back/flank pain with urination, and/or blood in the urine. These things are very normal.  Taking the pain medications and/or anti-inflammatories will help to manage this discomfort if present. If you have any questions or concerns you can contact [facility] office at [phone]. Ureteral Stent Placement: What to Expect at [facility]     A ureteral (say \"you-REE-ter-ul\") stent is a thin, hollow tube that is placed in the ureter to help urine pass from the kidney into the bladder. Ureters are the tubes that connect the kidneys to the bladder. You may have a small amount of blood in your urine for 1 to 3 days after the procedure. While the stent is in place, you may have to urinate more often, feel a sudden need to urinate, or feel like you can't completely empty your bladder. You may feel some pain when you urinate or do strenuous activity. You also may notice a small amount of blood in your urine after strenuous activities. These side effects usually don't prevent people from doing their normal daily activities. You may have a thin string coming out of your urethra. Your urethra is the tube that carries urine from your bladder to outside your body. This string is attached to the stent. Try not to pull on the string. It will be used to pull out the stent when you no longer need it.   After the procedure, urine may flow better from your kidneys to your bladder. A ureteral stent may be left in place for several days or for as long as several months. Your doctor will take it out when you no longer need it. Or, in some cases, it may be taken out at home. This care sheet gives you a general idea about how long it will take for you to recover. But each person recovers at a different pace. Follow the steps below to get better as quickly as possible. How can you care for yourself at home? Activity    Rest when you feel tired. Getting enough sleep will help you recover. Avoid strenuous activities, such as bicycle riding, jogging, weight lifting, or aerobic exercise, until your doctor says it is okay. Ask your doctor when you can drive again. Most people are able to return to work the day after the procedure. If your work requires intense activity, you may feel pain in your kidney area or get tired easily. If this happens, you may need to do less strenuous activities while the stent is in. Ask your doctor when it is okay for you to have sex. Diet    You can eat your normal diet. If your stomach is upset, try bland, low-fat foods like plain rice, broiled chicken, toast, and yogurt. Drink plenty of fluids (unless your doctor tells you not to). Medicines    Your doctor will tell you if and when you can restart your medicines. You will also get instructions about taking any new medicines. If you take aspirin or some other blood thinner, ask your doctor if and when to start taking it again. Make sure that you understand exactly what your doctor wants you to do. Be safe with medicines. Take pain medicines exactly as directed. If the doctor gave you a prescription medicine for pain, take it as prescribed. If you are not taking a prescription pain medicine, ask your doctor if you can take an over-the-counter medicine. If you think your pain medicine is making you sick to your stomach:   Take your medicine after meals (unless your doctor has told you not to). Ask your doctor for a different pain medicine. If your doctor prescribed antibiotics, take them as directed. Do not stop taking them just because you feel better. You need to take the full course of antibiotics. Follow-up care is a key part of your treatment and safety. Be sure to make and go to all appointments, and call your doctor if you are having problems. It's also a good idea to know your test results and keep a list of the medicines you take. When should you call for help? Call 911 anytime you think you may need emergency care. For example, call if:    You passed out (lost consciousness). You have severe trouble breathing. You have sudden chest pain and shortness of breath, or you cough up blood. You have severe belly pain. Call your doctor now or seek immediate medical care if:    Part or all of the stent comes out of your urethra. You have pain that does not get better after you take pain medicine. You have symptoms of a urinary infection. For example: You have blood or pus in your urine. You have pain in your back just below your rib cage. This is called flank pain. You have a fever, chills, or body aches. It hurts to urinate. You have groin or belly pain. You cannot control when you urinate, or you leak urine. Watch closely for changes in your health, and be sure to contact your doctor if you have any problems. Where can you learn more? Go to [email or web address]. org and sign in to your [employer] account. Enter R012 in the [facility] box to learn more about \"Ureteral Stent Placement: What to Expect at Home. \"     If you do not have an account, please click on the \"Sign Up Now\" link. Current as of: February 10, 2021               Content Version: 12.9  © 2006-2021 Healthwise[employer]. Care instructions adapted under license by [facility] ([facility]).  If you have questions about a medical condition or this instruction, always ask your healthcare professional. [place] [address] any warranty or liability for your use of this information.

## 2022-07-22 NOTE — PROGRESS NOTES
Department of Internal Medicine  Nephrology Progress Note    Events reviewed. S/p cystoscopy, retrograde pyelogram and possible right stent placement. SUBJECTIVE:  We are following this patient for SHLOMO and HAGMA. The patient is resting comfortably and has no complaints.      Physical Exam:    VITALS:  BP (!) 153/70   Pulse 72   Temp 96.9 °F (36.1 °C) (Temporal)   Resp 18   Ht 5' 5\" (1.651 m)   Wt 169 lb (76.7 kg)   SpO2 94%   BMI 28.12 kg/m²   24HR INTAKE/OUTPUT:    Intake/Output Summary (Last 24 hours) at 7/22/2022 1249  Last data filed at 7/22/2022 8994  Gross per 24 hour   Intake 2280.21 ml   Output 650 ml   Net 1630.21 ml         Constitutional: Awake, alert, NAD  HEENT: PERRL, normocephalic, atraumatic  Respiratory: CTA B, normal respirations  Cardiovascular/Edema: RRR, S1/S2, no edema noted on exam  Gastrointestinal: Abdomen soft, nontender  Neurologic: Confused, baseline  Skin: Warm, dry, no rash or lesion  Other: velazquez catheter    Scheduled Meds:   cefTRIAXone (ROCEPHIN) IV  1,000 mg IntraVENous Q24H    sodium chloride flush  10 mL IntraVENous 2 times per day    carvedilol  12.5 mg Oral BID WC    digoxin  125 mcg Oral Daily    lisinopril  10 mg Oral Daily    OLANZapine  2.5 mg Oral Nightly    rosuvastatin  10 mg Oral QPM     Continuous Infusions:   sodium chloride       PRN Meds:.sodium chloride flush, sodium chloride, promethazine **OR** ondansetron, polyethylene glycol, acetaminophen **OR** acetaminophen, haloperidol, haloperidol lactate, LORazepam    DATA:    CBC with Differential:    Lab Results   Component Value Date/Time    WBC 7.7 07/22/2022 04:34 AM    RBC 3.79 07/22/2022 04:34 AM    HGB 10.8 07/22/2022 04:34 AM    HCT 34.8 07/22/2022 04:34 AM     07/22/2022 04:34 AM    MCV 91.8 07/22/2022 04:34 AM    MCH 28.5 07/22/2022 04:34 AM    MCHC 31.0 07/22/2022 04:34 AM    RDW 13.7 07/22/2022 04:34 AM    LYMPHOPCT 17.0 07/18/2022 06:44 AM    MONOPCT 7.4 07/18/2022 06:44 AM    BASOPCT 0.9 07/18/2022 06:44 AM    MONOSABS 0.58 07/18/2022 06:44 AM    LYMPHSABS 1.34 07/18/2022 06:44 AM    EOSABS 0.26 07/18/2022 06:44 AM    BASOSABS 0.07 07/18/2022 06:44 AM     CMP:    Lab Results   Component Value Date/Time     07/22/2022 04:34 AM    K 4.0 07/22/2022 04:34 AM    K 5.0 07/18/2022 06:44 AM     07/22/2022 04:34 AM    CO2 25 07/22/2022 04:34 AM    BUN 22 07/22/2022 04:34 AM    CREATININE 1.0 07/22/2022 04:34 AM    GFRAA >60 07/22/2022 04:34 AM    LABGLOM 53 07/22/2022 04:34 AM    GLUCOSE 102 07/22/2022 04:34 AM    PROT 6.9 07/18/2022 06:44 AM    LABALBU 2.8 07/18/2022 06:44 AM    CALCIUM 8.3 07/22/2022 04:34 AM    BILITOT 0.5 07/18/2022 06:44 AM    ALKPHOS 113 07/18/2022 06:44 AM    AST 21 07/18/2022 06:44 AM    ALT 13 07/18/2022 06:44 AM     Magnesium:  No results found for: MG  Phosphorus:  No results found for: PHOS  Radiology Review:      Renal Flow scan July 20, 2022   Findings are compatible with obstruction on the right and likely there   is a component of bilateral renal failure               CT AP WO Contrast July 20, 2022   1. Dilatation of the right renal pelvis. 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 If there is a history of   hematuria, retrograde urogram recommended. 2.  Prior cholecystectomy, internal endo biliary stent located within the   common bile duct extending into the duodenum without evidence of complication. 3.  Small centrilobular densities in the right and left lower lobe which may   be related to infection/inflammation       4. Jensen catheter in the urinary bladder. Renal US  July 19, 2022   there is moderate to severe hydronephrosis of the right kidney or multiple   parapelvic cysts. Nonobstructing stones seen in the left kidney. The bladder is incompletely   distended. BRIEF SUMMARY OF INITIAL CONSULT:       Briefly, Ms. Chip Reid, is an 53-year-old female with a past medical history of HTN, AF, HLD, dementia, and bipolar/schizophrenia, who was admitted after presenting to the ER on July 17, 2022 from [facility] with fatigue and hypotension from possible oversedation. Ms. Paradise Mckenna was given haldol and ativan approximately 24 hours before arrival to the ED. Labs on admission were significant for sodium 139 potassium 5.0, chloride 101, bicarb 17, BUN 80, creatinine 4.1, and anion gap 21. We are consulted for SHLOMO and metabolic acidosis. Current home medications include carvedilol, digoxin, lisinopril, olanzapine, rosuvastatin, ativan and haldol. Problems resolved. Hypotension  HAGMA, multifactorial, 2/2 SHLOMO and uremia with a component of starvation ketosis. IMPRESSION/RECOMMENDATIONS:       SHLOMO stage III, volume responsive pre-renal SHLOMO from poor oral intake in the setting of ACEi administration. On admission her creatinine was 4.1 mg/dL, renal function has improved to 1.0 mg/dL. Right hydronephrosis versus multiple parapelvic cyst, await renal flow scan. CT AP demonstrates dilation of the right renal pelvis suggesting congenital versus aquired UPJ obstruction likely long-standing. Status post cystoscopy panendoscopy, retrograde pyelogram and right ureteral stent placement, 7/21  HTN, on carvedilol and lisinopril, we recommend not to restart lisinopril since patient is a very high risk of SHLOMO in view of poor oral intake  ------------------------------------------------------------------  GNR bacteruria and Viridans strep bacteremia, started on rocephin  Altered mental status, likely d/t over medication at skilled facility, improved to baseline  Atrial fibrillation, rate controlled, not on chronic anticoagulation.  On digoxin  HLD, on statin  Dementia  Bipolar/schitzophrenia, on olanzapine and ziprasidone  Nutrition, regular diet        Plan:     Stop IVF  Discontinue lisinopril  Amlodipine 5 mg p.o. daily to start tomorrow  Continue to monitor renal function daily  Continue to monitor BP, avoid hypotension  Continue strict I&O        Electronically signed by JESSICA Johnson CNP on 7/22/2022 at 12:49 PM

## 2022-07-22 NOTE — PROGRESS NOTES
Comprehensive Nutrition Assessment    Type and Reason for Visit:  Initial, RD Nutrition Re-Screen/LOS    Nutrition Recommendations/Plan:   Continue current diet  Start magic cup BID to promote adequate oral intake  Will monitor     Malnutrition Assessment:  Malnutrition Status: At risk for malnutrition (Comment) (07/22/22 0445)    Context:  Chronic Illness     Findings of the 6 clinical characteristics of malnutrition:  Energy Intake:  75% or less estimated energy requirements for 1 month or longer  Weight Loss:  Unable to assess (d/t lack of wt hx per EMR)     Body Fat Loss:  Mild body fat loss Orbital   Muscle Mass Loss:  No significant muscle mass loss    Fluid Accumulation:  No significant fluid accumulation     Strength:  Not Performed    Nutrition Assessment:    pt [facility] from a psychiatric hospital for fatigue/hypotensive; noted SHLOMO/HAGMA (renal function improved); AMS and R-hydronephrosis noted; pt s/p cystoscopy and stent placement 7/21; PMhx of dementia, AFIB,bipolar/schizophrenia; pt w/ suboptimal PO intake at meals (avg ~20-50%); yuki start ONS and monitor. Nutrition Related Findings:    mild orbital wasting; I/O WNL; alert; oriented to person; abd WNL ;no edema Wound Type: None       Current Nutrition Intake & Therapies:    Average Meal Intake: 26-50%  Average Supplements Intake: None Ordered  ADULT DIET; Regular  ADULT ORAL NUTRITION SUPPLEMENT; Lunch, Dinner; Frozen Oral Supplement    Anthropometric Measures:  Height: 5' 5\" (165.1 cm)  Ideal Body Weight (IBW): 125 lbs (57 kg)    Admission Body Weight: 178 lb (80.7 kg) (7/22-BS/estimated)  Current Body Weight: 178 lb (80.7 kg) (7/22-BS ; RD estimate of accurate wt d/t pt w/ multiple extra wt on bed), 142.4 % IBW. Current BMI (kg/m2): 29.6  Usual Body Weight:  (UTO d/t lack of wt hx per EMR)     Weight Adjustment For: No Adjustment                 BMI Categories: Overweight (BMI 25.0-29. 9)    Estimated Daily Nutrient Needs:  Energy Requirements

## 2022-07-22 NOTE — PROGRESS NOTES
concerns  Thank you for allowing us to participate in her care       Stephane Dowling, [address] [place] [address]  Urology

## 2022-07-22 NOTE — PROGRESS NOTES
awake, alert and following commands     Assessment/Plan:  -Acute renal failure, resolved  -Altered mental status  -Metabolic acidosis  -Hypotension  -Atrial fibrillation not on anticoagulation  -Dyslipidemia  -Right hydronephrosis        Plan  1. Continue IV fluids, creatinine improving, follow-up with nephrology, continue to monitor labs, hold nephrotoxic medications  2. Metabolic acidosis improved, currently off bicarb drip  3. Blood pressure stable, continue to monitor  4. Heart rate controlled, INR was supratherapeutic but Coumadin was not on her med rec, INR trending down  5.  CT abdomen reviewed  Status post cystoscopy panendoscopy, retrograde pyelogram and right ureteral stent placement. On 7/21  Discontinued all IV fluids on the morning of 7/22 as patient's renal function has restored to normal and patient is taking optimal nutrition by mouth  One of the blood culture from 7/18 positive, consult ID urine culture from 7/17 grew mixed gram-positive organism    DISPOSITION:   Will discharge on 7/22 if cleared by ID and urology.     Medications:  REVIEWED DAILY    Infusion Medications    lactated ringers 125 mL/hr at 07/22/22 0721    dextrose 5 % and 0.9 % NaCl 75 mL/hr at 07/20/22 1534    sodium chloride       Scheduled Medications    sodium chloride flush  10 mL IntraVENous 2 times per day    carvedilol  12.5 mg Oral BID WC    digoxin  125 mcg Oral Daily    [Held by provider] lisinopril  10 mg Oral Daily    OLANZapine  2.5 mg Oral Nightly    rosuvastatin  10 mg Oral QPM     PRN Meds: sodium chloride flush, sodium chloride, promethazine **OR** ondansetron, polyethylene glycol, acetaminophen **OR** acetaminophen, haloperidol, haloperidol lactate, LORazepam    Labs:     Recent Labs     07/21/22  0620 07/22/22  0434   WBC 6.4 7.7   HGB 12.7 10.8*   HCT 42.3 34.8    248       Recent Labs     07/20/22  0502 07/21/22  0620 07/22/22  0434    143 139   K 4.4 4.2 4.0    109* 105   CO2 27 26 25 BUN 33* 26* 22   CREATININE 1.3* 1.1* 1.0   CALCIUM 8.9 8.6 8.3*       No results for input(s): PROT, ALB, ALKPHOS, ALT, AST, BILITOT, AMYLASE, LIPASE in the last 72 hours. Recent Labs     07/21/22  0620   INR 1.4       No results for input(s): Dione Mould in the last 72 hours. Chronic labs:    Lab Results   Component Value Date    INR 1.4 07/21/2022       Radiology: REVIEWED DAILY    +++++++++++++++++++++++++++++++++++++++++++++++++  Stu Reza MD  [facility] Physician - [address] [place] [address]  +++++++++++++++++++++++++++++++++++++++++++++++++  NOTE: This report was transcribed using voice recognition software. Every effort was made to ensure accuracy; however, inadvertent computerized transcription errors may be present.

## 2022-07-22 NOTE — CONSULTS
Department of Internal Medicine  Infectious Diseases   Consult Note      Reason for Consult: Bacteremia       Requesting Physician:  Dr Maximo Schirmer:                The patient is a 82 y.o. female nursing home resident presented to the ER ( on 7/17) with decreased mentation ( apparently, pt was given ativan and haldol as pt had been screaming ) . Pt was hypotensive . Initial labs showed SHLOMO - Cr of 4.1. CT scan of abdomen and pelvis -and renal scan showed - UPJ obstruction . Pt underwent cystoscopy and right renal stent insertion ( 7/21) . Urine cx - GNR, blood cx viridans strep .       Past Medical History:      Dementia     Past Surgical History:          Procedure Laterality Date    BLADDER SURGERY Right 7/21/2022    CYSTOSCOPY RETROGRADE PYELOGRAM RIGHT STENT INSERTION, VENTURA CHANGE performed by Nohemy Vitale MD at [facility] OR       Current Medications:      Current Facility-Administered Medications   Medication Dose Route Frequency Provider Last Rate Last Admin    sodium chloride flush 0.9 % injection 10 mL  10 mL IntraVENous 2 times per day Yolanda Bhumi, DO   10 mL at 07/22/22 0856    sodium chloride flush 0.9 % injection 10 mL  10 mL IntraVENous PRN Yolanda Bhumi, DO        0.9 % sodium chloride infusion   IntraVENous PRN Yolanda Bhumi, DO        promethazine (PHENERGAN) tablet 12.5 mg  12.5 mg Oral Q6H PRN Yolanda Bhumi, DO        Or    ondansetron (ZOFRAN) injection 4 mg  4 mg IntraVENous Q6H PRN Yolanda Bhumi, DO        polyethylene glycol (GLYCOLAX) packet 17 g  17 g Oral Daily PRN Yolanda Bhumi, DO        acetaminophen (TYLENOL) tablet 650 mg  650 mg Oral Q6H PRN Yolanda Bhumi, DO   650 mg at 07/21/22 2016    Or    acetaminophen (TYLENOL) suppository 650 mg  650 mg Rectal Q6H PRN Yolanda Bhumi, DO        carvedilol (COREG) tablet 12.5 mg  12.5 mg Oral BID  Baron Gamaliel MD   12.5 mg at 07/22/22 0856    digoxin (LANOXIN) tablet 125 mcg  125 mcg Oral Daily Reza DURÁN MD Bridgette   125 mcg at 07/22/22 0856    haloperidol (HALDOL) tablet 2 mg  2 mg Oral Q8H PRN Carolina Diaz MD   2 mg at 07/18/22 0940    haloperidol lactate (HALDOL) injection 2 mg  2 mg IntraMUSCular Q8H PRN Carolina Diaz MD   2 mg at 07/19/22 1623    lisinopril (PRINIVIL;ZESTRIL) tablet 10 mg  10 mg Oral Daily Carolina Diaz MD   10 mg at 07/22/22 0856    LORazepam (ATIVAN) tablet 1 mg  1 mg Oral Q6H PRN Carolina Diaz MD   1 mg at 07/19/22 1422    OLANZapine (ZYPREXA) tablet 2.5 mg  2.5 mg Oral Nightly Carolina Diaz MD   2.5 mg at 07/21/22 2016    rosuvastatin (CRESTOR) tablet 10 mg  10 mg Oral QPM Carolina Diaz MD   10 mg at 07/21/22 1707       Allergies:  Patient has no known allergies. Social History:    Social History     Socioeconomic History    Marital status:      Spouse name: Not on file    Number of children: Not on file    Years of education: Not on file    Highest education level: Not on file   Occupational History    Not on file   Tobacco Use    Smoking status: Not on file    Smokeless tobacco: Not on file   Substance and Sexual Activity    Alcohol use: Not on file    Drug use: Not on file    Sexual activity: Not on file   Other Topics Concern    Not on file   Social History Narrative    Not on file     Social Determinants of Health     Financial Resource Strain: Not on file   Food Insecurity: Not on file   Transportation Needs: Not on file   Physical Activity: Not on file   Stress: Not on file   Social Connections: Not on file   Intimate Partner Violence: Not on file   Housing Stability: Not on file         Family History:     Not pertinent to present illness       REVIEW OF SYSTEMS:    CONSTITUTIONAL:  no fever   HEENT: denies blurring of vision or double vision, denies hearing problem  RESPIRATORY: denies cough, shortness of breath, sputum expectoration, chest pain.   CARDIOVASCULAR:  Denies palpitation  GASTROINTESTINAL:  Denies abdomen pain, diarrhea or constipation. GENITOURINARY:  Denies burning urination or frequency of urination  INTEGUMENT: denies wound , rash  HEMATOLOGIC/LYMPHATIC:  Denies lymph node swelling, gum bleeding or easy bruising. MUSCULOSKELETAL:  Denies leg pain , joint pain , joint swelling  NEUROLOGICAL:  not feeling well       PHYSICAL EXAM:      Vitals:     Vitals:    07/22/22 0300   BP: 138/74   Pulse: 83   Resp: 17   Temp: 98.3 °F (36.8 °C)   SpO2: 91%       General Appearance:    Awake, alert , no acute distress. Head:    Normocephalic, atraumatic   Eyes:    No pallor, no icterus,   Ears:    No obvious deformity or drainage.    Nose:   No nasal drainage   Throat:   Mucosa moist, no oral thrush   Neck:   Supple, no lymphadenopathy   Back:     no CVA tenderness   Lungs:     Clear to auscultation bilaterally, no wheeze    Heart:    Regular rate and rhythm, no murmur   Abdomen:     Soft, non-tender, bowel sounds present    Extremities:   No edema, no cyanosis    Pulses:   Dorsalis pedis palpable    Skin:   no rashes      CBC with Differential:      Lab Results   Component Value Date/Time    WBC 7.7 07/22/2022 04:34 AM    RBC 3.79 07/22/2022 04:34 AM    HGB 10.8 07/22/2022 04:34 AM    HCT 34.8 07/22/2022 04:34 AM     07/22/2022 04:34 AM    MCV 91.8 07/22/2022 04:34 AM    MCH 28.5 07/22/2022 04:34 AM    MCHC 31.0 07/22/2022 04:34 AM    RDW 13.7 07/22/2022 04:34 AM    LYMPHOPCT 17.0 07/18/2022 06:44 AM    MONOPCT 7.4 07/18/2022 06:44 AM    BASOPCT 0.9 07/18/2022 06:44 AM    MONOSABS 0.58 07/18/2022 06:44 AM    LYMPHSABS 1.34 07/18/2022 06:44 AM    EOSABS 0.26 07/18/2022 06:44 AM    BASOSABS 0.07 07/18/2022 06:44 AM       CMP     Lab Results   Component Value Date/Time     07/22/2022 04:34 AM    K 4.0 07/22/2022 04:34 AM    K 5.0 07/18/2022 06:44 AM     07/22/2022 04:34 AM    CO2 25 07/22/2022 04:34 AM    BUN 22 07/22/2022 04:34 AM    CREATININE 1.0 07/22/2022 04:34 AM    GFRAA >60 07/22/2022 04:34 AM    LABGLOM 53 07/22/2022 04:34 AM    GLUCOSE 102 07/22/2022 04:34 AM    PROT 6.9 07/18/2022 06:44 AM    LABALBU 2.8 07/18/2022 06:44 AM    CALCIUM 8.3 07/22/2022 04:34 AM    BILITOT 0.5 07/18/2022 06:44 AM    ALKPHOS 113 07/18/2022 06:44 AM    AST 21 07/18/2022 06:44 AM    ALT 13 07/18/2022 06:44 AM         Hepatic Function Panel:    Lab Results   Component Value Date/Time    ALKPHOS 113 07/18/2022 06:44 AM    ALT 13 07/18/2022 06:44 AM    AST 21 07/18/2022 06:44 AM    PROT 6.9 07/18/2022 06:44 AM    BILITOT 0.5 07/18/2022 06:44 AM    LABALBU 2.8 07/18/2022 06:44 AM       PT/INR:    Lab Results   Component Value Date/Time    PROTIME 14.8 07/21/2022 06:20 AM    INR 1.4 07/21/2022 06:20 AM       TSH:  No results found for: TSH    U/A:    Lab Results   Component Value Date/Time    COLORU Yellow 07/17/2022 03:40 PM    PHUR 5.5 07/17/2022 03:40 PM    CLARITYU Clear 07/17/2022 03:40 PM    SPECGRAV 1.015 07/17/2022 03:40 PM    LEUKOCYTESUR Negative 07/17/2022 03:40 PM    UROBILINOGEN 0.2 07/17/2022 03:40 PM    BILIRUBINUR Negative 07/17/2022 03:40 PM    BLOODU Negative 07/17/2022 03:40 PM    GLUCOSEU Negative 07/17/2022 03:40 PM       ABG:  No results found for: VGR5QCS, BEART, X0XAMSBW, PHART, THGBART, WBB9OOK, PO2ART, WFC1DFH    MICROBIOLOGY:    Blood culture -  Specimen Source: Blood     Organism Streptococcus viridans group Abnormal        Urine Culture -  GNR      Radiology :    CT scan of abdomen and pelvis -  Impression:        1. Dilatation of the right renal pelvis. 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 If there is a history of   hematuria, retrograde urogram recommended. 2.  Prior cholecystectomy, internal endo biliary stent located within the   common bile duct extending into the duodenum without evidence of complication. 3.  Small centrilobular densities in the right and left lower lobe which may   be related to infection/inflammation     4.   Jensen catheter in the urinary bladder.         IMPRESSION:     GNR bacteriuria - UPJ obstruction s/p cystoscopy and right renal stent   Viridans strep bacteremia - no s/s of endovascular infection     RECOMMENDATIONS:       Rocephin 1 gram IV q 24 hrs for Urine cx, - no abx for blood cx   Await final urine cx, change to oral abx     Thank you Dr Fina Saba for the consult

## 2022-07-23 VITALS
WEIGHT: 178 LBS | HEIGHT: 65 IN | RESPIRATION RATE: 16 BRPM | SYSTOLIC BLOOD PRESSURE: 101 MMHG | HEART RATE: 71 BPM | TEMPERATURE: 97.1 F | DIASTOLIC BLOOD PRESSURE: 61 MMHG | OXYGEN SATURATION: 96 % | BODY MASS INDEX: 29.66 KG/M2

## 2022-07-23 LAB
ANION GAP SERPL CALCULATED.3IONS-SCNC: 12 MMOL/L (ref 7–16)
BUN BLDV-MCNC: 19 MG/DL (ref 6–23)
CALCIUM SERPL-MCNC: 8.3 MG/DL (ref 8.6–10.2)
CHLORIDE BLD-SCNC: 107 MMOL/L (ref 98–107)
CO2: 24 MMOL/L (ref 22–29)
CREAT SERPL-MCNC: 1.1 MG/DL (ref 0.5–1)
GFR AFRICAN AMERICAN: 57
GFR NON-AFRICAN AMERICAN: 47 ML/MIN/1.73
GLUCOSE BLD-MCNC: 109 MG/DL (ref 74–99)
HCT VFR BLD CALC: 32.1 % (ref 34–48)
HEMOGLOBIN: 10.5 G/DL (ref 11.5–15.5)
MCH RBC QN AUTO: 28.9 PG (ref 26–35)
MCHC RBC AUTO-ENTMCNC: 32.7 % (ref 32–34.5)
MCV RBC AUTO: 88.4 FL (ref 80–99.9)
PDW BLD-RTO: 13.4 FL (ref 11.5–15)
PLATELET # BLD: 262 E9/L (ref 130–450)
PMV BLD AUTO: 9.4 FL (ref 7–12)
POTASSIUM SERPL-SCNC: 3.8 MMOL/L (ref 3.5–5)
RBC # BLD: 3.63 E12/L (ref 3.5–5.5)
SARS-COV-2, NAAT: NOT DETECTED
SODIUM BLD-SCNC: 143 MMOL/L (ref 132–146)
WBC # BLD: 7.1 E9/L (ref 4.5–11.5)

## 2022-07-23 PROCEDURE — 2580000003 HC RX 258: Performed by: FAMILY MEDICINE

## 2022-07-23 PROCEDURE — 6360000002 HC RX W HCPCS: Performed by: INTERNAL MEDICINE

## 2022-07-23 PROCEDURE — 2580000003 HC RX 258: Performed by: INTERNAL MEDICINE

## 2022-07-23 PROCEDURE — 80048 BASIC METABOLIC PNL TOTAL CA: CPT

## 2022-07-23 PROCEDURE — 6370000000 HC RX 637 (ALT 250 FOR IP): Performed by: INTERNAL MEDICINE

## 2022-07-23 PROCEDURE — 36415 COLL VENOUS BLD VENIPUNCTURE: CPT

## 2022-07-23 PROCEDURE — 85027 COMPLETE CBC AUTOMATED: CPT

## 2022-07-23 PROCEDURE — 6370000000 HC RX 637 (ALT 250 FOR IP): Performed by: FAMILY MEDICINE

## 2022-07-23 PROCEDURE — 87635 SARS-COV-2 COVID-19 AMP PRB: CPT

## 2022-07-23 RX ORDER — CEPHALEXIN 500 MG/1
500 CAPSULE ORAL 4 TIMES DAILY
Qty: 40 CAPSULE | Refills: 0 | Status: SHIPPED | OUTPATIENT
Start: 2022-07-23 | End: 2022-07-23 | Stop reason: SDUPTHER

## 2022-07-23 RX ORDER — CEFDINIR 300 MG/1
300 CAPSULE ORAL EVERY 12 HOURS SCHEDULED
Status: DISCONTINUED | OUTPATIENT
Start: 2022-07-23 | End: 2022-07-23 | Stop reason: HOSPADM

## 2022-07-23 RX ORDER — CEPHALEXIN 500 MG/1
500 CAPSULE ORAL 4 TIMES DAILY
Qty: 12 CAPSULE | Refills: 0 | Status: SHIPPED | OUTPATIENT
Start: 2022-07-23 | End: 2022-07-23 | Stop reason: HOSPADM

## 2022-07-23 RX ORDER — CEFDINIR 300 MG/1
300 CAPSULE ORAL EVERY 12 HOURS SCHEDULED
Qty: 20 CAPSULE | Refills: 0 | Status: SHIPPED | OUTPATIENT
Start: 2022-07-23 | End: 2022-08-02

## 2022-07-23 RX ADMIN — DIGOXIN 125 MCG: 125 TABLET ORAL at 08:56

## 2022-07-23 RX ADMIN — LORAZEPAM 1 MG: 1 TABLET ORAL at 08:56

## 2022-07-23 RX ADMIN — AMLODIPINE BESYLATE 5 MG: 5 TABLET ORAL at 08:56

## 2022-07-23 RX ADMIN — CARVEDILOL 12.5 MG: 6.25 TABLET, FILM COATED ORAL at 08:56

## 2022-07-23 RX ADMIN — CEFDINIR 300 MG: 300 CAPSULE ORAL at 13:14

## 2022-07-23 RX ADMIN — HALOPERIDOL 2 MG: 1 TABLET ORAL at 01:39

## 2022-07-23 RX ADMIN — Medication 10 ML: at 08:58

## 2022-07-23 NOTE — PROGRESS NOTES
Department of Internal Medicine  Infectious Diseases  Progress Note      C/C : viridans strep bacteremia     Denies fever or chills   Feels better   Afebrile       Current Facility-Administered Medications   Medication Dose Route Frequency Provider Last Rate Last Admin    cefTRIAXone (ROCEPHIN) 1,000 mg in sterile water 10 mL IV syringe  1,000 mg IntraVENous Q24H Uriel Machado MD   1,000 mg at 07/22/22 1102    amLODIPine (NORVASC) tablet 5 mg  5 mg Oral Daily Giuliano Doherty MD   5 mg at 07/23/22 0856    sodium chloride flush 0.9 % injection 10 mL  10 mL IntraVENous 2 times per day Garnette Coats, DO   10 mL at 07/23/22 0858    sodium chloride flush 0.9 % injection 10 mL  10 mL IntraVENous PRN Garnette Coats, DO        0.9 % sodium chloride infusion   IntraVENous PRN Garnette Coats, DO        promethazine (PHENERGAN) tablet 12.5 mg  12.5 mg Oral Q6H PRN Garnette Coats, DO        Or    ondansetron [facility]) injection 4 mg  4 mg IntraVENous Q6H PRN Garnette Coats, DO        polyethylene glycol (GLYCOLAX) packet 17 g  17 g Oral Daily PRN Garnette Coats, DO        acetaminophen (TYLENOL) tablet 650 mg  650 mg Oral Q6H PRN Garnette Coats, DO   650 mg at 07/21/22 2016    Or    acetaminophen (TYLENOL) suppository 650 mg  650 mg Rectal Q6H PRN Garnette Coats, DO        carvedilol (COREG) tablet 12.5 mg  12.5 mg Oral BID  Merissa العلي MD   12.5 mg at 07/23/22 0856    digoxin (LANOXIN) tablet 125 mcg  125 mcg Oral Daily Merissa العلي MD   125 mcg at 07/23/22 0856    haloperidol (HALDOL) tablet 2 mg  2 mg Oral Q8H PRN Merissa العلي MD   2 mg at 07/23/22 0139    haloperidol lactate (HALDOL) injection 2 mg  2 mg IntraMUSCular Q8H PRN Merissa العلي MD   2 mg at 07/19/22 1623    LORazepam (ATIVAN) tablet 1 mg  1 mg Oral Q6H PRN Merissa العلي MD   1 mg at 07/23/22 0856    OLANZapine (ZYPREXA) tablet 2.5 mg  2.5 mg Oral Nightly Merissa العلي MD   2.5 mg at 07/22/22 2113    rosuvastatin (CRESTOR) tablet 10 mg  10 mg Oral QPM Arina Cobian MD   10 mg at 07/22/22 1716           REVIEW OF SYSTEMS:    CONSTITUTIONAL:  no fever   HEENT: denies blurring of vision or double vision, denies hearing problem  RESPIRATORY: denies cough, shortness of breath, sputum expectoration, chest pain. CARDIOVASCULAR:  Denies palpitation  GASTROINTESTINAL:  Denies abdomen pain, diarrhea or constipation. GENITOURINARY:  Denies burning urination or frequency of urination  INTEGUMENT: denies wound , rash  HEMATOLOGIC/LYMPHATIC:  Denies lymph node swelling, gum bleeding or easy bruising. MUSCULOSKELETAL:  Denies leg pain , joint pain , joint swelling  NEUROLOGICAL:  not feeling well       PHYSICAL EXAM:      Vitals:     Vitals:    07/23/22 0800   BP: 101/61   Pulse: 71   Resp: 16   Temp: 97.1 °F (36.2 °C)   SpO2: 96%       General Appearance:    Awake, alert , no acute distress. Head:    Normocephalic, atraumatic   Eyes:    No pallor, no icterus,   Ears:    No obvious deformity or drainage.    Nose:   No nasal drainage   Throat:   Mucosa moist, no oral thrush   Neck:   Supple, no lymphadenopathy   Back:     no CVA tenderness   Lungs:     Clear to auscultation bilaterally, no wheeze    Heart:    Regular rate and rhythm, no murmur   Abdomen:     Soft, non-tender, bowel sounds present    Extremities:   No edema, no cyanosis    Pulses:   Dorsalis pedis palpable    Skin:   no rashes      CBC with Differential:      Lab Results   Component Value Date/Time    WBC 7.1 07/23/2022 06:15 AM    RBC 3.63 07/23/2022 06:15 AM    HGB 10.5 07/23/2022 06:15 AM    HCT 32.1 07/23/2022 06:15 AM     07/23/2022 06:15 AM    MCV 88.4 07/23/2022 06:15 AM    MCH 28.9 07/23/2022 06:15 AM    MCHC 32.7 07/23/2022 06:15 AM    RDW 13.4 07/23/2022 06:15 AM    LYMPHOPCT 17.0 07/18/2022 06:44 AM    MONOPCT 7.4 07/18/2022 06:44 AM    BASOPCT 0.9 07/18/2022 06:44 AM    MONOSABS 0.58 07/18/2022 06:44 AM    LYMPHSABS 1.34 07/18/2022 06:44 AM    EOSABS 0.26 is a history of   hematuria, retrograde urogram recommended. 2.  Prior cholecystectomy, internal endo biliary stent located within the   common bile duct extending into the duodenum without evidence of complication. 3.  Small centrilobular densities in the right and left lower lobe which may   be related to infection/inflammation     4. Jensen catheter in the urinary bladder.         IMPRESSION:     GNR bacteriuria - UPJ obstruction s/p cystoscopy and right renal stent   Viridans strep bacteremia -contamination     RECOMMENDATIONS:       Stop IV rocephin   Oral omnicef 300 mg po q 12 hrs X 10 days   OK to discharge from ID POV

## 2022-07-23 NOTE — CARE COORDINATION
7/23/2022 social work transition of care planning  Sw set up [facility] ambulance for 2 pm to [facility]. Will need neg covid test. Sw updated charge Nurse and spoke to pt's son,Savannah. N/N [phone].   Electronically signed by GREG Gan on 7/23/2022 at 10:21 AM

## 2022-07-23 NOTE — PROGRESS NOTES
Department of Internal Medicine  Nephrology Progress Note    Events reviewed. SUBJECTIVE:  We are following this patient for SHLOMO and HAGMA. She denies any complaints.      Physical Exam:    VITALS:  /61   Pulse 71   Temp 97.1 °F (36.2 °C) (Temporal)   Resp 16   Ht 5' 5\" (1.651 m)   Wt 178 lb (80.7 kg)   SpO2 96%   BMI 29.62 kg/m²   24HR INTAKE/OUTPUT:    Intake/Output Summary (Last 24 hours) at 7/23/2022 1216  Last data filed at 7/23/2022 0830  Gross per 24 hour   Intake 360 ml   Output 1300 ml   Net -940 ml       Constitutional: Awake, alert, NAD  HEENT: PERRL, normocephalic, atraumatic  Respiratory: CTA B, normal respirations  Cardiovascular/Edema: RRR, S1/S2, no edema noted on exam  Gastrointestinal: Abdomen soft, nontender  Neurologic: Confused, baseline  Skin: Warm, dry, no rash or lesion  Other: velazquez catheter    Scheduled Meds:   cefdinir  300 mg Oral 2 times per day    amLODIPine  5 mg Oral Daily    sodium chloride flush  10 mL IntraVENous 2 times per day    carvedilol  12.5 mg Oral BID WC    digoxin  125 mcg Oral Daily    OLANZapine  2.5 mg Oral Nightly    rosuvastatin  10 mg Oral QPM     Continuous Infusions:   sodium chloride       PRN Meds:.sodium chloride flush, sodium chloride, promethazine **OR** ondansetron, polyethylene glycol, acetaminophen **OR** acetaminophen, haloperidol, haloperidol lactate, LORazepam    DATA:    CBC with Differential:    Lab Results   Component Value Date/Time    WBC 7.1 07/23/2022 06:15 AM    RBC 3.63 07/23/2022 06:15 AM    HGB 10.5 07/23/2022 06:15 AM    HCT 32.1 07/23/2022 06:15 AM     07/23/2022 06:15 AM    MCV 88.4 07/23/2022 06:15 AM    MCH 28.9 07/23/2022 06:15 AM    MCHC 32.7 07/23/2022 06:15 AM    RDW 13.4 07/23/2022 06:15 AM    LYMPHOPCT 17.0 07/18/2022 06:44 AM    MONOPCT 7.4 07/18/2022 06:44 AM    BASOPCT 0.9 07/18/2022 06:44 AM    MONOSABS 0.58 07/18/2022 06:44 AM    LYMPHSABS 1.34 07/18/2022 06:44 AM    EOSABS 0.26 07/18/2022 06:44 AM BASOSABS 0.07 07/18/2022 06:44 AM     CMP:    Lab Results   Component Value Date/Time     07/23/2022 06:15 AM    K 3.8 07/23/2022 06:15 AM    K 5.0 07/18/2022 06:44 AM     07/23/2022 06:15 AM    CO2 24 07/23/2022 06:15 AM    BUN 19 07/23/2022 06:15 AM    CREATININE 1.1 07/23/2022 06:15 AM    GFRAA 57 07/23/2022 06:15 AM    LABGLOM 47 07/23/2022 06:15 AM    GLUCOSE 109 07/23/2022 06:15 AM    PROT 6.9 07/18/2022 06:44 AM    LABALBU 2.8 07/18/2022 06:44 AM    CALCIUM 8.3 07/23/2022 06:15 AM    BILITOT 0.5 07/18/2022 06:44 AM    ALKPHOS 113 07/18/2022 06:44 AM    AST 21 07/18/2022 06:44 AM    ALT 13 07/18/2022 06:44 AM     Magnesium:  No results found for: MG  Phosphorus:  No results found for: PHOS  Radiology Review:      Renal Flow scan July 20, 2022   Findings are compatible with obstruction on the right and likely there   is a component of bilateral renal failure               CT AP WO Contrast July 20, 2022   1. Dilatation of the right renal pelvis. 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 If there is a history of   hematuria, retrograde urogram recommended. 2.  Prior cholecystectomy, internal endo biliary stent located within the   common bile duct extending into the duodenum without evidence of complication. 3.  Small centrilobular densities in the right and left lower lobe which may   be related to infection/inflammation       4. Jensen catheter in the urinary bladder. Renal US  July 19, 2022   there is moderate to severe hydronephrosis of the right kidney or multiple   parapelvic cysts. Nonobstructing stones seen in the left kidney. The bladder is incompletely   distended. BRIEF SUMMARY OF INITIAL CONSULT:       Briefly, Ms. Geovanni Monae, is an 82-year-old female with a past medical history of HTN, AF, HLD, dementia, and bipolar/schizophrenia, who was admitted after presenting to the ER on July 17, 2022 from [facility] with fatigue and hypotension from possible oversedation. Ms. Ha Artist was given haldol and ativan approximately 24 hours before arrival to the ED. Labs on admission were significant for sodium 139 potassium 5.0, chloride 101, bicarb 17, BUN 80, creatinine 4.1, and anion gap 21. We are consulted for SHLOMO and metabolic acidosis. Current home medications include carvedilol, digoxin, lisinopril, olanzapine, rosuvastatin, ativan and haldol. Problems resolved. Hypotension  HAGMA, multifactorial, 2/2 SHLOMO and uremia with a component of starvation ketosis. SHLOMO stage III, volume responsive pre-renal SHLOMO from poor oral intake in the setting of ACEi administration. On admission her creatinine was 4.1 mg/dL. Resolved. IMPRESSION/RECOMMENDATIONS:      Right hydronephrosis versus multiple parapelvic cyst, await renal flow scan. CT AP demonstrates dilation of the right renal pelvis suggesting congenital versus aquired UPJ obstruction likely long-standing. Status post cystoscopy panendoscopy, retrograde pyelogram and right ureteral stent placement, 7/21    HTN, on carvedilol and lisinopril, we recommend not to restart lisinopril since patient is a very high risk of SHLOMO in view of poor oral intake  ------------------------------------------------------------------  GNR bacteruria and Viridans strep bacteremia, on cefdinir  Altered mental status, likely d/t over medication at skilled facility, improved to baseline  Atrial fibrillation, rate controlled, not on chronic anticoagulation.  On digoxin  HLD, on statin  Dementia  Bipolar/schitzophrenia, on olanzapine and ziprasidone        Plan:     Continue amlodipine 5 mg p.o. daily  Continue to monitor renal function daily  Continue to monitor BP, avoid hypotension  Avoid lisinopril in the future  Continue strict I&O    Case and plan discussed with Dr. Scarlet Gilford      Electronically signed by JESSICA Aguilar CNP on 7/23/2022 at 12:16 PM

## 2022-07-23 NOTE — PROGRESS NOTES
Hospitalist Progress Note      SYNOPSIS: Patient admitted on 2022 for potential altered mental status from nursing facility. 62-year-old female with past medical history atrial fibrillation on digoxin and Coreg but not on anticoagulation, hypertension, hyperlipidemia bipolar/schizophrenia who [facility] presents due to hypotension and altered mental status from a nursing facility. Nephrology has been consulted, patient was started on bicarb drip. Renal function improved. Ultrasound of the kidney showed moderate to severe right hydronephrosis and urology was consulted. CT abdomen which showed dilation of right renal pelvis concerning for congenital versus acquired UPJ obstruction. Urology following. Jensen catheter removed on . Patient was seen by ID on , recommended no antibiotics for blood cultures which are most likely a contamination. Recommended continuing ceftriaxone until final urine cultures. SUBJECTIVE:    Patient seen and examined in her room. Alert awake oriented x2-3. More coherent and responsive today. Denies any chest pain, nausea, vomiting. Records reviewed. Stable overnight. No other overnight issues reported. Temp (24hrs), Av.3 °F (36.3 °C), Min:96.9 °F (36.1 °C), Max:97.8 °F (36.6 °C)    DIET: ADULT DIET; Regular  ADULT ORAL NUTRITION SUPPLEMENT; Lunch, Dinner; Frozen Oral Supplement  CODE: Full Code    Intake/Output Summary (Last 24 hours) at 2022 0804  Last data filed at 2022 0602  Gross per 24 hour   Intake 240 ml   Output 1300 ml   Net -1060 ml       OBJECTIVE:    /61   Pulse 71   Temp 97.1 °F (36.2 °C) (Temporal)   Resp 16   Ht 5' 5\" (1.651 m)   Wt 178 lb (80.7 kg)   SpO2 96%   BMI 29.62 kg/m²     General appearance: No apparent distress, appears stated age and cooperative. HEENT:  Conjunctivae/corneas clear. Neck: Supple. No jugular venous distention.    Respiratory: Clear to auscultation bilaterally, normal respiratory effort  Cardiovascular: Regular rate rhythm, normal S1-S2  Abdomen: Soft, nontender, nondistended  Musculoskeletal: No clubbing, cyanosis, no bilateral lower extremity edema. Brisk capillary refill. Skin:  No rashes  on visible skin  Neurologic: awake, alert and following commands     Assessment/Plan:  -Acute renal failure, resolved  -Altered mental status  -Metabolic acidosis  -Hypotension  -Atrial fibrillation not on anticoagulation  -Dyslipidemia  -Right hydronephrosis        Plan  1. Continue IV fluids, creatinine improving, follow-up with nephrology, continue to monitor labs, hold nephrotoxic medications  2. Metabolic acidosis improved, currently off bicarb drip  3. Blood pressure stable, continue to monitor  4. Heart rate controlled, INR was supratherapeutic but Coumadin was not on her med rec, INR trending down  5.  CT abdomen reviewed  Status post cystoscopy panendoscopy, retrograde pyelogram and right ureteral stent placement. On 7/21  Discontinued all IV fluids on the morning of 7/22 as patient's renal function has restored to normal and patient is taking optimal nutrition by mouth  One of the blood culture from 7/18 positive, consult ID urine culture from 7/17 grew mixed gram-positive organism  ID recommends monitoring off antibiotics for blood cultures, continue IV ceftriaxone for urine cultures. DISPOSITION:   Will discharge on 7/22 if cleared by ID and urology.     Medications:  REVIEWED DAILY    Infusion Medications    sodium chloride       Scheduled Medications    cefTRIAXone (ROCEPHIN) IV  1,000 mg IntraVENous Q24H    amLODIPine  5 mg Oral Daily    sodium chloride flush  10 mL IntraVENous 2 times per day    carvedilol  12.5 mg Oral BID WC    digoxin  125 mcg Oral Daily    OLANZapine  2.5 mg Oral Nightly    rosuvastatin  10 mg Oral QPM     PRN Meds: sodium chloride flush, sodium chloride, promethazine **OR** ondansetron, polyethylene glycol, acetaminophen **OR** acetaminophen, haloperidol, haloperidol lactate, LORazepam    Labs:     Recent Labs     07/21/22  0620 07/22/22  0434 07/23/22  0615   WBC 6.4 7.7 7.1   HGB 12.7 10.8* 10.5*   HCT 42.3 34.8 32.1*    248 262       Recent Labs     07/21/22  0620 07/22/22  0434 07/23/22  0615    139 143   K 4.2 4.0 3.8   * 105 107   CO2 26 25 24   BUN 26* 22 19   CREATININE 1.1* 1.0 1.1*   CALCIUM 8.6 8.3* 8.3*       No results for input(s): PROT, ALB, ALKPHOS, ALT, AST, BILITOT, AMYLASE, LIPASE in the last 72 hours. Recent Labs     07/21/22  0620   INR 1.4       No results for input(s): Ardelle Chalk in the last 72 hours. Chronic labs:    Lab Results   Component Value Date    INR 1.4 07/21/2022       Radiology: REVIEWED DAILY    +++++++++++++++++++++++++++++++++++++++++++++++++  Josr Mast MD  [facility] - [address] [place] [address]  +++++++++++++++++++++++++++++++++++++++++++++++++  NOTE: This report was transcribed using voice recognition software. Every effort was made to ensure accuracy; however, inadvertent computerized transcription errors may be present.

## 2022-07-23 NOTE — DISCHARGE SUMMARY
Hospitalist Discharge Summary    Patient ID: Marysol Iraheta   Patient : 1935  Patient's PCP: No primary care provider on file. Admit Date: 2022   Admitting Physician: Ivette Esteves MD    Discharge Date:  2022   Discharge Physician: Santos Mayo MD   Discharge Condition: Stable  [facility] in brief:  (Please refer to daily progress notes for a comprehensive review of the hospitalization by requesting medical records)    Patient admitted on 2022 for potential altered mental status from nursing facility. 33-year-old female with past medical history atrial fibrillation on digoxin and Coreg but not on anticoagulation, hypertension, hyperlipidemia bipolar/schizophrenia [facility] presents due to hypotension and altered mental status from a nursing facility. Nephrology has been consulted, patient was started on bicarb drip. Renal function improved. Ultrasound of the kidney showed moderate to severe right hydronephrosis and urology was consulted. CT abdomen which showed dilation of right renal pelvis concerning for congenital versus acquired UPJ obstruction. Urology following. Jensen catheter removed on . Patient was seen by ID on , recommended no antibiotics for blood cultures which are most likely a contamination. Recommended continuing ceftriaxone until final urine cultures. Patient's urine culture came back positive as mixed serene. Discharged on Keflex for 3 more days to complete 5-day treatment for simple UTI. Urinalysis was without bacteriuria. As per urology, patient will be discharged with a Jensen catheter.   Plan is to follow-up with urology outpatient for voiding trial.    Consults:   IP CONSULT TO NEPHROLOGY  IP CONSULT TO UROLOGY  IP CONSULT TO INFECTIOUS DISEASES    Discharge Diagnoses:    Assessment/Plan:  -Acute renal failure, resolved  -Altered mental status  -Metabolic acidosis  -Hypotension  -Atrial fibrillation not on anticoagulation  -Dyslipidemia  -Right hydronephrosis        Plan  1. Continue IV fluids, creatinine improving, follow-up with nephrology, continue to monitor labs, hold nephrotoxic medications  2. Metabolic acidosis improved, currently off bicarb drip  3. Blood pressure stable, continue to monitor  4. Heart rate controlled, INR was supratherapeutic but Coumadin was not on her med rec, INR trending down  5.  CT abdomen reviewed  Status post cystoscopy panendoscopy, retrograde pyelogram and right ureteral stent placement. On 7/21  Discontinued all IV fluids on the morning of 7/22 as patient's renal function has restored to normal and patient is taking optimal nutrition by mouth  One of the blood culture from 7/18 positive, consult ID urine culture from 7/17 grew mixed gram-positive organism  ID recommends monitoring off antibiotics for blood cultures, continue IV ceftriaxone for urine cultures. Discharge Instructions / Follow up:    No future appointments. Continued appropriate risk factor modification of blood pressure, diabetes and serum lipids will remain essential to reducing risk of future atherosclerotic development    Activity: activity as tolerated    Significant labs:  CBC:   Recent Labs     07/21/22  0620 07/22/22  0434 07/23/22  0615   WBC 6.4 7.7 7.1   RBC 4.41 3.79 3.63   HGB 12.7 10.8* 10.5*   HCT 42.3 34.8 32.1*   MCV 95.9 91.8 88.4   RDW 13.7 13.7 13.4    248 262     BMP:   Recent Labs     07/21/22  0620 07/22/22  0434 07/23/22  0615    139 143   K 4.2 4.0 3.8   * 105 107   CO2 26 25 24   BUN 26* 22 19   CREATININE 1.1* 1.0 1.1*     LFT:  No results for input(s): PROT, ALB, ALKPHOS, ALT, AST, BILITOT, AMYLASE, LIPASE in the last 72 hours. PT/INR:   Recent Labs     07/21/22 0620   INR 1.4     BNP: No results for input(s): BNP in the last 72 hours.   Hgb A1C: No results found for: LABA1C  Folate and B12: No results found for: Vertie Rho, No results found for: FOLATE  Thyroid Studies: No results found for: TSH, D3MSYUB, R4PJLWT, THYROIDAB    Urinalysis:    Lab Results   Component Value Date/Time    NITRU Negative 07/17/2022 03:40 PM    BLOODU Negative 07/17/2022 03:40 PM    SPECGRAV 1.015 07/17/2022 03:40 PM    GLUCOSEU Negative 07/17/2022 03:40 PM       Imaging:  CT ABDOMEN PELVIS WO CONTRAST Additional Contrast? None    Result Date: 7/20/2022  EXAMINATION: CT OF THE ABDOMEN AND PELVIS WITHOUT CONTRAST 7/20/2022 10:33 am TECHNIQUE: CT of the abdomen and pelvis was performed without the administration of intravenous contrast. Multiplanar reformatted images are provided for review. Automated exposure control, iterative reconstruction, and/or weight based adjustment of the mA/kV was utilized to reduce the radiation dose to as low as reasonably achievable. COMPARISON: None. HISTORY: ORDERING SYSTEM PROVIDED HISTORY: right hydronephrosis TECHNOLOGIST PROVIDED HISTORY: Reason for exam:->right hydronephrosis Additional Contrast?->None What reading provider will be dictating this exam?->CRC FINDINGS: Lower Chest: Are some scattered centrilobular densities in the right left lower lobe usually associated with inflammatory etiology/pneumonia. No pleural fluid detected. Heart appears normal in size. Organs: The unenhanced liver and spleen appear normal in size without focal lesion. 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 Clips are noted in the gallbladder fossa. There is a stent within the common bile duct. Stent appears to be in appropriate position. Pancreas appears normal.  The adrenal glands are normal. There is left nephrolithiasis without obstruction. Right kidney shows cortical atrophy and dilatation of the calices and renal pelvis terminating at the UPJ. Right ureter is normal in diameter. No calculi detected. 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  GI/Bowel: No evidence of bowel obstruction or inflammation. The appendix is normal.  Minimal sigmoid diverticulosis. Pelvis: Urinary bladder is contracted and contains a Jensen catheter with a small amount of gas within the urinary bladder. No free pelvic fluid. Uterus is normal.  Benign vascular calcifications are present. Peritoneum/Retroperitoneum: No retroperitoneal mass or adenopathy. Aorta is nonaneurysmal. Bones/Soft Tissues: There are large non marginal osteophytes throughout the thoracolumbar spine. Degenerative disc disease and facet arthropathy at L4-5 is present with grade 1 anterolisthesis of L4 on L5. Incomplete segmentation of L2-3 is noted. 1.  Dilatation of the right renal pelvis. 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 If there is a history of hematuria, retrograde urogram recommended. 2.  Prior cholecystectomy, internal endo biliary stent located within the common bile duct extending into the duodenum without evidence of complication. 3.  Small centrilobular densities in the right and left lower lobe which may be related to infection/inflammation 4. Jensen catheter in the urinary bladder. XR HIP RIGHT (2-3 VIEWS)    Result Date: 2022  EXAMINATION: TWO XRAY VIEWS OF THE RIGHT HIP 2022 6:43 am COMPARISON: None. HISTORY: ORDERING SYSTEM PROVIDED HISTORY: pain, no trauma TECHNOLOGIST PROVIDED HISTORY: Reason for exam:->pain, no trauma What reading provider will be dictating this exam?->CRC FINDINGS: No acute fracture or osseous malalignment of the visualized right femur and hip with femoral head well located demonstrating mild-to-moderate degenerative changes.   No acute cortical irregularity or fracture     No acute osseous findings of the right well located hip or proximal right femur     NM KIDNEY W FLOW AND FUNCTION W PHARMACOLOGICAL INTERVENTION    Result Date: 2022  Patient MRN: [id number] : 1935 Age:  82 years Gender: Female Order Date: 2022 1:19 PM Exam: NM KIDNEY W FLOW AND FUNCTION W PHARMACOLOGICAL INTERVENTION Number of Images: 2 views Indication:   Right hydronephrosis? With IV Lasix Right hydronephrosis? What reading provider will be dictating this exam?->[facility] Comparison: None. Findings: The patient was injected with 8 mCi of technetium MAG3 intravenously The patient was injected with 10 mg grams of Lasix IV at 30 minutes Split uptake on the left was 79  percent, time to peak was 14.5 minutes, one half clearance time was 43 minutes Split uptake on the right was 21 percent, time to peak was 45.5 minutes, one half clearance time was 76 minutes Renal curves demonstrate perfusion to be severely delayed on the right. . There is more mild delay on the left Extraction was severely delayed on the right. .There is more mild delay on the left Excretion was severely delayed on the right. .There is more mild delay on the left There is no improvement with Lasix administration     Findings are compatible with obstruction on the right and likely there is a component of bilateral renal failure     FL RETROGRADE PYELOGRAM W WO KUB    Result Date: 7/21/2022  EXAMINATION: SPOT FLUOROSCOPIC IMAGES 7/21/2022 3:16 pm TECHNIQUE: Fluoroscopy was provided by the radiology department for procedure. Radiologist was not present during examination. FLUOROSCOPY DOSE AND TYPE OR TIME AND EXPOSURES: 5 images FLUOROSCOPY TIME: Not provided COMPARISON: CT abdomen and pelvis from July 20, 2022 HISTORY: ORDERING SYSTEM PROVIDED HISTORY: Acute renal failure, unspecified acute renal failure type [facility]) TECHNOLOGIST PROVIDED HISTORY: Reason for exam:->Acute renal failure, unspecified acute renal failure type [facility]) What reading provider will be dictating this exam?->CRC Intraprocedural imaging. FINDINGS: 5 spot images of the abdomen were obtained. Fluoroscopic support provided to subspecialty service for bilateral retrograde pyelograms and right ureteric stent placement.   No obvious complication on the images provided. Please see subspecialty report for full details and interpretation of real time imaging. Intraprocedural fluoroscopic spot images as above. See separate procedure report for more information. XR CHEST PORTABLE    Result Date: 7/21/2022  EXAMINATION: ONE XRAY VIEW OF THE CHEST 7/21/2022 8:17 am COMPARISON: None. HISTORY: ORDERING SYSTEM PROVIDED HISTORY: pre op TECHNOLOGIST PROVIDED HISTORY: Reason for exam:->pre op What reading provider will be dictating this exam?->CRC FINDINGS: Portable chest reveals cardiac silhouette to be borderline enlarged. Small hiatal hernia. Lung fields are clear with no focal parenchymal opacification present. Degenerative changes seen within the spine. Pulmonary vascularity is within normal limits. No pleural effusion or pneumothorax. No acute cardiopulmonary disease. US RETROPERITONEAL COMPLETE    Result Date: 7/18/2022  EXAMINATION: RETROPERITONEAL ULTRASOUND OF THE KIDNEYS AND URINARY BLADDER 7/18/2022 COMPARISON: None HISTORY: ORDERING SYSTEM PROVIDED HISTORY: [place] TECHNOLOGIST PROVIDED HISTORY: Reason for exam:->[place] What reading provider will be dictating this exam?->CRC FINDINGS: Kidneys: The right kidney measures 11.6 cm in length and the left kidney measures 10.5 cm in length. Kidneys demonstrate normal cortical echogenicity. There is extensive dilatation identified of the renal pelvis on the right or parapelvic cysts. There is a nonobstructing stone seen in the left kidney measuring 1.3 cm in length. No distension seen in the left renal collecting system. The bladder is incompletely distended. Bladder: Incomplete distension of the bladder. there is moderate to severe hydronephrosis of the right kidney or multiple parapelvic cysts. Nonobstructing stones seen in the left kidney. The bladder is incompletely distended.        Discharge Medications:      Medication List        START taking these medications cephALEXin 500 MG capsule  Commonly known as: KEFLEX  Take 1 capsule by mouth in the morning and 1 capsule at noon and 1 capsule in the evening and 1 capsule before bedtime. Do all this for 3 days. CONTINUE taking these medications      acetaminophen 325 MG tablet  Commonly known as: TYLENOL     benzocaine-menthol 15-3.6 MG lozenge  Commonly known as: CEPACOL SORE THROAT     carvedilol 12.5 MG tablet  Commonly known as: COREG     digoxin 125 MCG tablet  Commonly known as: LANOXIN     haloperidol 2 MG tablet  Commonly known as: HALDOL     haloperidol lactate 5 MG/ML injection  Commonly known as: HALDOL     lisinopril 10 MG tablet  Commonly known as: PRINIVIL;ZESTRIL     LORazepam 1 MG tablet  Commonly known as: ATIVAN     OLANZapine 2.5 MG tablet  Commonly known as: ZYPREXA     rosuvastatin 10 MG tablet  Commonly known as: CRESTOR               Where to Get Your Medications        These medications were sent to [facility] [address] [place] [address]      Phone: [phone]   cephALEXin 500 MG capsule         Time Spent on discharge is more than 45 minutes in the examination, evaluation, counseling and review of medications and discharge plan.    +++++++++++++++++++++++++++++++++++++++++++++++++  Kaylee Ahmadi MD  [address]  +++++++++++++++++++++++++++++++++++++++++++++++++  NOTE: This report was transcribed using voice recognition software. Every effort was made to ensure accuracy; however, inadvertent computerized transcription errors may be present.

## 2022-07-25 LAB
BLOOD CULTURE, ROUTINE: NORMAL
CULTURE, BLOOD 2: NORMAL

## 2024-04-05 NOTE — DISCHARGE INSTR - COC
Continuity of Care Form    Patient Name: John Foster   :  1935  MRN:  [id number]    Admit date:  2022  Discharge date:  2022    Code Status Order: Full Code   Advance Directives:     Admitting Physician:  Iain Mabry MD  PCP: No primary care provider on file. Discharging Nurse:   [address] Unit/Room#: 46/56-A  Discharging Unit Phone Number: [phone]    Emergency Contact:   Extended Emergency Contact Information  Primary Emergency Contact: [address] [place] [address]  Mobile Phone: [phone]  Relation: Child    Past Surgical History:  Past Surgical History:   Procedure Laterality Date    BLADDER SURGERY Right 2022    CYSTOSCOPY RETROGRADE PYELOGRAM RIGHT STENT INSERTION, VENTURA CHANGE performed by Marko Trimble MD at [address] [place] [address]       Immunization History: There is no immunization history on file for this patient. Active Problems:  Patient Active Problem List   Diagnosis Code    [facility] ([facility]) ([facility] N17.9       Isolation/Infection:   Isolation            No Isolation          Patient Infection Status       None to display            Nurse Assessment:  Last Vital Signs: /61   Pulse 71   Temp 97.1 °F (36.2 °C) (Temporal)   Resp 16   Ht 5' 5\" (1.651 m)   Wt 178 lb (80.7 kg)   SpO2 96%   BMI 29.62 kg/m²     Last documented pain score (0-10 scale): Pain Level: 6  Last Weight:   Wt Readings from Last 1 Encounters:   22 178 lb (80.7 kg)     Mental Status:  disoriented    IV Access:  - None    Nursing Mobility/ADLs:  Walking   Dependent  Transfer  Dependent  Bathing  Dependent  Dressing  Dependent  Toileting  Dependent  Feeding  Dependent  Med Admin  Dependent  Med Delivery   whole    Wound Care Documentation and Therapy:        Elimination:  Continence: Bowel: No  Bladder: No  Urinary Catheter:  Insertion Date: 2022    Colostomy/Ileostomy/Ileal Conduit: No       Date of Last BM: 2022    Intake/Output Summary (Last 24 hours) at 2022 Palliative Care Progress Note    Reason for Palliative Care: Complex Decision Making, Education, Patient/Family, Goals of Care, and Family Meeting     Subjective      RRT called yesterday due to afib RVR with rates 150s-160s. Cardizem increased and patient was started on bumex drip. Creatinine uptrending, so bumex drip stopped. Rates remain elevated at 120-160s despite optimal medical management, cardiology recommending cardioversion but patient declined. Also with increasing tbili, elevated lactic acid, elevated procal, and leukocytosis, sepsis workup pending.   Patient seen this afternoon with 2 sons at bedside. Reports frustration that no one is getting her out of bed to chair. No other physical complaints, constipation has resolved.    Review of Systems  Review of Systems   Constitutional:  Positive for activity change and fatigue.   Cardiovascular:  Positive for leg swelling.   All other systems reviewed and are negative.        Palliative Care Assessment Tools  Palliative Performance Scale  Palliative Performance Scale: Ambulation Reduced. unable to do Hobby/House Work Significant Disease. Self-Care Occasional Assistance Necessary. Intake Normal or Reduced. Consciousness Level Full or Confusion.       Palliative Symptom Assessment Tool    Part 1.  How severe are your symptoms over the past week?    Pain? 0     Fatigue/tiredness?       Nausea or vomiting?            Lack of appetite?           Dry mouth?            Constipation?           Diarrhea?            Feeling sad/worried/upset?           Short of breath?    0     Difficulty getting around?           Falls?             Lack of remembering?         Numbness or tingling?            Part 2:  How have these symptoms interfered with your life?    Ability to do the things that you enjoy doing?         Ability to care for yourself?        Relations with other people?        Ability to work/household chores          Objective      Medications:  Medications  Reviewed: Yes    Vital Signs:   Vital Last Value (24 Hour) 24 Hour Range   Temperature 97.3 °F (36.3 °C) (04/05/24 1446) Temp  Min: 97.3 °F (36.3 °C)  Max: 98.1 °F (36.7 °C)   Pulse 74 (04/05/24 1446) Pulse  Min: 53  Max: 155   Arterial   Blood Pressure   No data recorded   Non-Invasive  Blood Pressure 96/64 (04/05/24 1446) BP  Min: 95/63  Max: 111/75   Central Venous Pressure   No data recorded   Respiratory 16 (04/05/24 1446) Resp  Min: 16  Max: 18   SpO2 95 % (04/05/24 1446) SpO2  Min: 91 %  Max: 98 %       Physical Exam  Vitals and nursing note reviewed.   HENT:      Mouth/Throat:      Mouth: Mucous membranes are moist.   Eyes:      General: Scleral icterus present.   Cardiovascular:      Rate and Rhythm: Tachycardia present. Rhythm irregular.   Pulmonary:      Effort: Pulmonary effort is normal.   Musculoskeletal:      Right lower leg: Edema present.      Left lower leg: Edema present.   Skin:     General: Skin is warm and dry.      Coloration: Skin is jaundiced.   Neurological:      General: No focal deficit present.      Mental Status: She is alert.         Labs & Imaging  Recent Labs   Lab 04/05/24  0436 04/04/24  2006 04/04/24  0828 04/04/24  0504 04/03/24  1542 04/03/24  0458   SODIUM 143 142  --  144  --  145   POTASSIUM 4.2 4.0 3.2* 2.8*   < > 3.1*   CHLORIDE 100 101  --  100  --  100   CO2 35* 35*  --  36*  --  36*   BUN 64* 67*  --  53*  --  50*   CREATININE 1.67* 1.66*  --  1.28*  --  1.15*   CALCIUM 8.4 8.7  --  7.3*  --  8.1*   ALBUMIN 2.4*  --   --  2.1*  --  2.3*   BILIRUBIN 10.2*  --   --  7.8*  --  7.0*   ALKPT 280*  --   --  235*  --  237*   *  --   --  136*  --  149*   *  --   --  180*  --  176*   GLUCOSE 123* 141*  --  106*  --  122*    < > = values in this interval not displayed.      Recent Labs   Lab 04/05/24  0436 04/04/24  0504 04/03/24  0458   WBC 16.1* 13.6* 14.0*   RBC 4.04 4.05 4.16   HGB 12.5 12.6 12.8   HCT 37.7 37.9 39.1   PLT 99* 89* 78*        Imaging Reports  Facility for less 30 days.      Update Admission H&P: No change in H&P    PHYSICIAN SIGNATURE:  Electronically signed by Glenny Womack MD on 7/23/22 at 10:36 AM EDT Reviewed: Yes     Advance Care Planning/Goals of Care   Discussion:   Met with patient, along with sons, Zion and Berto, and family medicine resident, Dr. Franco. Patient was able to recount the events of the last 24 hours, including that her heart rate was elevated, medications were adjusted, and that cardiology is recommending cardioversion. Patient's sons were updated and questions answered, as they were not aware of these events. Discussed with patient that we need to determine a plan for her care moving forward, noting that as things stand right now, she has indicated that she wants continued medical management with the goal of prolonging her life to the greatest extent possible, but refusing recommendations for treatment. Again offered the option of living with whatever medical issues arise with the focus on ensuring comfort through symptom management rather than treating the underlying cause, but that this would likely be at the expense of a more limited life expectancy, which patient rejected. Explored her concerns regarding cardioversion and she stated \"it can cause cardiac arrest.\" Acknowledged that this is a possibility, but not correcting her RVR can also result in cardiac arrest, which she also acknowledged. Patient stated that she just wants to go home, so explained that she is not stable for discharge in her current condition. Noted that patient has frequently expressed feeling overwhelmed with the amount of information she is being given and the number of providers she is seeing. Offered that an alternative would be to assign someone else, such as a family member, to be the point of contact for the medical team, which would allow them to be the ones to share information with her and for her to discuss her wishes with them rather than the medical team. Patient rolled her eyes and expressed \"knowing all about the Power of ,\" and declined further consideration of this option. She then stated she  needed to go to the bathroom and requested privacy.   Spoke in the fink with patient's sons and additional questions about cardioversion were answered to their apparent satisfaction. Patient's son Zion requested that he be updated with changes in patient's condition so that family can address these discussions with her and try to help her with making decisions. He provided his contact information, which was added to Epic. He acknowledged that patient has a long history of delaying difficult decisions or trying to put them off. Also explained to him that without POA for healthcare paperwork, decision making would fall to all 5 of patient's sons together and that decisions would be made as a consensus among them. Family plans to discuss further with patient and try to support her in making a decision.              Assessment      75 year old female with history of CAD, PAD, RA, HTN, and GERD who presented [facility] with palpitations, shortness of breath, and leg pain/swelling found to have sepsis and afib with RVR. Imaging in the ED showed supraclavicular, mediastinal, and left hilar lymphadenopathy as well as multiple nodules in the lungs and liver highly concerning for metastatic oncologic process of unknown primary. 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 plan reviewed   Recs:  -reports constipation is improved with metamucil  -denies other discomfort; continue current management    Psychological Aspects:  Orientation x4  Cognition/capacity intact  Insight difficult to assess    Psychosocial Aspects:  Lives with:  alone    Spiritual/Cultural Aspects:  None noted    Ethics/Legal  Advanced directives: No  POAHC: No  Code status: No  POLST: No       Total Time Spent today on this visit EXCLUDES time spent with Advance Care Planning  Total time spent today on this visit is 60 minutes which includes reviewing tests in preparation to see patient, performing a medically appropriate exam and evaluation, counseling and educating the patient/family/caregiver, and communicating with other healthcare professionals.  Additional 45 minutes spent in advance care planning.  Discussed With: IM team, RN    Thank you for involving Palliative Care.  Please contact the covering provider via [employer] ([place])/[employer] ([place]) with further questions or concerns.    Quan Dowling, ANP-BC, [facility]  Palliative Care APN  Office: [phone]        Progress Note For Department Use Only        LVAD: No  #1 Post-Visit: No REFUSED  #2 Post-Visit: No  #3 Post-Visit: No

## (undated) DEVICE — CATHETER URET 5FR L70CM OPN END SGL LUMN INJ HUB FLEXIMA

## (undated) DEVICE — CATHETER F BLLN 5CC 16FR 2 W HYDRGEL COAT LESS TRAUM LUB

## (undated) DEVICE — SOLUTION IV IRRIG WATER 1000ML POUR BRL 2F7114

## (undated) DEVICE — GOWN,SIRUS,FABRNF,L,20/CS: Brand: MEDLINE

## (undated) DEVICE — WET SKIN PREP TRAY: Brand: MEDLINE INDUSTRIES, INC.

## (undated) DEVICE — CYSTO: Brand: MEDLINE INDUSTRIES, INC.

## (undated) DEVICE — MEDIA CONTRAST ISOVUE  300 10X50ML

## (undated) DEVICE — DRAINBAG,ANTI-REFLUX TOWER,L/F,2000ML,LL: Brand: MEDLINE

## (undated) DEVICE — GARMENT,MEDLINE,DVT,INT,CALF,MED, GEN2: Brand: MEDLINE

## (undated) DEVICE — SOLUTION IRRIG 3000ML STRL H2O USP UROMATIC PLAS CONT

## (undated) DEVICE — GLOVE SURG SIGNATURE LTX ESS LTX PF 7.5

## (undated) DEVICE — GUIDEWIRE VASC STR 3 CM 0.035 INX150 CM STD NIT ZIPWIRE

## (undated) DEVICE — GLOVE ORANGE PI 7 1/2   MSG9075